# Patient Record
Sex: MALE | Race: OTHER | NOT HISPANIC OR LATINO
[De-identification: names, ages, dates, MRNs, and addresses within clinical notes are randomized per-mention and may not be internally consistent; named-entity substitution may affect disease eponyms.]

---

## 2023-09-01 ENCOUNTER — NON-APPOINTMENT (OUTPATIENT)
Age: 53
End: 2023-09-01

## 2023-09-15 ENCOUNTER — INPATIENT (INPATIENT)
Facility: HOSPITAL | Age: 53
LOS: 3 days | Discharge: HOME | DRG: 302 | End: 2023-09-19
Attending: THORACIC SURGERY (CARDIOTHORACIC VASCULAR SURGERY) | Admitting: THORACIC SURGERY (CARDIOTHORACIC VASCULAR SURGERY)
Payer: MEDICAID

## 2023-09-15 VITALS
DIASTOLIC BLOOD PRESSURE: 124 MMHG | HEIGHT: 70 IN | OXYGEN SATURATION: 95 % | TEMPERATURE: 99 F | WEIGHT: 168.65 LBS | RESPIRATION RATE: 19 BRPM | SYSTOLIC BLOOD PRESSURE: 176 MMHG | HEART RATE: 108 BPM

## 2023-09-15 LAB
A1C WITH ESTIMATED AVERAGE GLUCOSE RESULT: 8.4 % — HIGH (ref 4–5.6)
ALBUMIN SERPL ELPH-MCNC: 3.6 G/DL — SIGNIFICANT CHANGE UP (ref 3.3–5)
ALP SERPL-CCNC: 100 U/L — SIGNIFICANT CHANGE UP (ref 40–120)
ALT FLD-CCNC: 27 U/L — SIGNIFICANT CHANGE UP (ref 10–45)
ANION GAP SERPL CALC-SCNC: 10 MMOL/L — SIGNIFICANT CHANGE UP (ref 5–17)
APTT BLD: 34.2 SEC — SIGNIFICANT CHANGE UP (ref 24.5–35.6)
AST SERPL-CCNC: 41 U/L — HIGH (ref 10–40)
BASOPHILS # BLD AUTO: 0.09 K/UL — SIGNIFICANT CHANGE UP (ref 0–0.2)
BASOPHILS NFR BLD AUTO: 1 % — SIGNIFICANT CHANGE UP (ref 0–2)
BILIRUB SERPL-MCNC: 0.6 MG/DL — SIGNIFICANT CHANGE UP (ref 0.2–1.2)
BLD GP AB SCN SERPL QL: NEGATIVE — SIGNIFICANT CHANGE UP
BLD GP AB SCN SERPL QL: NEGATIVE — SIGNIFICANT CHANGE UP
BUN SERPL-MCNC: 28 MG/DL — HIGH (ref 7–23)
CALCIUM SERPL-MCNC: 9.3 MG/DL — SIGNIFICANT CHANGE UP (ref 8.4–10.5)
CHLORIDE SERPL-SCNC: 95 MMOL/L — LOW (ref 96–108)
CO2 SERPL-SCNC: 33 MMOL/L — HIGH (ref 22–31)
CREAT SERPL-MCNC: 0.97 MG/DL — SIGNIFICANT CHANGE UP (ref 0.5–1.3)
EGFR: 93 ML/MIN/1.73M2 — SIGNIFICANT CHANGE UP
EOSINOPHIL # BLD AUTO: 0.22 K/UL — SIGNIFICANT CHANGE UP (ref 0–0.5)
EOSINOPHIL NFR BLD AUTO: 2.5 % — SIGNIFICANT CHANGE UP (ref 0–6)
ESTIMATED AVERAGE GLUCOSE: 194 MG/DL — HIGH (ref 68–114)
GLUCOSE BLDC GLUCOMTR-MCNC: 211 MG/DL — HIGH (ref 70–99)
GLUCOSE SERPL-MCNC: 175 MG/DL — HIGH (ref 70–99)
HCT VFR BLD CALC: 39.5 % — SIGNIFICANT CHANGE UP (ref 39–50)
HGB BLD-MCNC: 13 G/DL — SIGNIFICANT CHANGE UP (ref 13–17)
IMM GRANULOCYTES NFR BLD AUTO: 0.2 % — SIGNIFICANT CHANGE UP (ref 0–0.9)
INR BLD: 1.24 — HIGH (ref 0.85–1.18)
LYMPHOCYTES # BLD AUTO: 1.72 K/UL — SIGNIFICANT CHANGE UP (ref 1–3.3)
LYMPHOCYTES # BLD AUTO: 19.2 % — SIGNIFICANT CHANGE UP (ref 13–44)
MCHC RBC-ENTMCNC: 30.1 PG — SIGNIFICANT CHANGE UP (ref 27–34)
MCHC RBC-ENTMCNC: 32.9 GM/DL — SIGNIFICANT CHANGE UP (ref 32–36)
MCV RBC AUTO: 91.4 FL — SIGNIFICANT CHANGE UP (ref 80–100)
MONOCYTES # BLD AUTO: 0.83 K/UL — SIGNIFICANT CHANGE UP (ref 0–0.9)
MONOCYTES NFR BLD AUTO: 9.3 % — SIGNIFICANT CHANGE UP (ref 2–14)
NEUTROPHILS # BLD AUTO: 6.09 K/UL — SIGNIFICANT CHANGE UP (ref 1.8–7.4)
NEUTROPHILS NFR BLD AUTO: 67.8 % — SIGNIFICANT CHANGE UP (ref 43–77)
NRBC # BLD: 0 /100 WBCS — SIGNIFICANT CHANGE UP (ref 0–0)
NT-PROBNP SERPL-SCNC: 1930 PG/ML — HIGH (ref 0–300)
PLATELET # BLD AUTO: 221 K/UL — SIGNIFICANT CHANGE UP (ref 150–400)
POTASSIUM SERPL-MCNC: 3.8 MMOL/L — SIGNIFICANT CHANGE UP (ref 3.5–5.3)
POTASSIUM SERPL-SCNC: 3.8 MMOL/L — SIGNIFICANT CHANGE UP (ref 3.5–5.3)
PROT SERPL-MCNC: 7 G/DL — SIGNIFICANT CHANGE UP (ref 6–8.3)
PROTHROM AB SERPL-ACNC: 14.1 SEC — HIGH (ref 9.5–13)
RBC # BLD: 4.32 M/UL — SIGNIFICANT CHANGE UP (ref 4.2–5.8)
RBC # FLD: 12.9 % — SIGNIFICANT CHANGE UP (ref 10.3–14.5)
RH IG SCN BLD-IMP: POSITIVE — SIGNIFICANT CHANGE UP
RH IG SCN BLD-IMP: POSITIVE — SIGNIFICANT CHANGE UP
SODIUM SERPL-SCNC: 138 MMOL/L — SIGNIFICANT CHANGE UP (ref 135–145)
TSH SERPL-MCNC: 0.72 UIU/ML — SIGNIFICANT CHANGE UP (ref 0.27–4.2)
WBC # BLD: 8.97 K/UL — SIGNIFICANT CHANGE UP (ref 3.8–10.5)
WBC # FLD AUTO: 8.97 K/UL — SIGNIFICANT CHANGE UP (ref 3.8–10.5)

## 2023-09-15 PROCEDURE — 71045 X-RAY EXAM CHEST 1 VIEW: CPT | Mod: 26

## 2023-09-15 RX ORDER — HEPARIN SODIUM 5000 [USP'U]/ML
5000 INJECTION INTRAVENOUS; SUBCUTANEOUS EVERY 8 HOURS
Refills: 0 | Status: DISCONTINUED | OUTPATIENT
Start: 2023-09-15 | End: 2023-09-19

## 2023-09-15 RX ORDER — DEXTROSE 50 % IN WATER 50 %
15 SYRINGE (ML) INTRAVENOUS ONCE
Refills: 0 | Status: DISCONTINUED | OUTPATIENT
Start: 2023-09-15 | End: 2023-09-19

## 2023-09-15 RX ORDER — DEXTROSE 50 % IN WATER 50 %
25 SYRINGE (ML) INTRAVENOUS ONCE
Refills: 0 | Status: DISCONTINUED | OUTPATIENT
Start: 2023-09-15 | End: 2023-09-19

## 2023-09-15 RX ORDER — SODIUM CHLORIDE 9 MG/ML
1000 INJECTION, SOLUTION INTRAVENOUS
Refills: 0 | Status: DISCONTINUED | OUTPATIENT
Start: 2023-09-15 | End: 2023-09-19

## 2023-09-15 RX ORDER — IPRATROPIUM/ALBUTEROL SULFATE 18-103MCG
3 AEROSOL WITH ADAPTER (GRAM) INHALATION EVERY 6 HOURS
Refills: 0 | Status: DISCONTINUED | OUTPATIENT
Start: 2023-09-15 | End: 2023-09-19

## 2023-09-15 RX ORDER — INSULIN LISPRO 100/ML
VIAL (ML) SUBCUTANEOUS
Refills: 0 | Status: DISCONTINUED | OUTPATIENT
Start: 2023-09-15 | End: 2023-09-16

## 2023-09-15 RX ORDER — ATORVASTATIN CALCIUM 80 MG/1
80 TABLET, FILM COATED ORAL AT BEDTIME
Refills: 0 | Status: DISCONTINUED | OUTPATIENT
Start: 2023-09-15 | End: 2023-09-19

## 2023-09-15 RX ORDER — ISOSORBIDE MONONITRATE 60 MG/1
30 TABLET, EXTENDED RELEASE ORAL DAILY
Refills: 0 | Status: DISCONTINUED | OUTPATIENT
Start: 2023-09-15 | End: 2023-09-19

## 2023-09-15 RX ORDER — METOPROLOL TARTRATE 50 MG
25 TABLET ORAL EVERY 12 HOURS
Refills: 0 | Status: DISCONTINUED | OUTPATIENT
Start: 2023-09-15 | End: 2023-09-16

## 2023-09-15 RX ORDER — ASPIRIN/CALCIUM CARB/MAGNESIUM 324 MG
81 TABLET ORAL DAILY
Refills: 0 | Status: DISCONTINUED | OUTPATIENT
Start: 2023-09-15 | End: 2023-09-19

## 2023-09-15 RX ORDER — ZOLPIDEM TARTRATE 10 MG/1
5 TABLET ORAL AT BEDTIME
Refills: 0 | Status: DISCONTINUED | OUTPATIENT
Start: 2023-09-15 | End: 2023-09-19

## 2023-09-15 RX ORDER — PANTOPRAZOLE SODIUM 20 MG/1
40 TABLET, DELAYED RELEASE ORAL
Refills: 0 | Status: DISCONTINUED | OUTPATIENT
Start: 2023-09-15 | End: 2023-09-19

## 2023-09-15 RX ORDER — SODIUM CHLORIDE 9 MG/ML
3 INJECTION INTRAMUSCULAR; INTRAVENOUS; SUBCUTANEOUS EVERY 8 HOURS
Refills: 0 | Status: DISCONTINUED | OUTPATIENT
Start: 2023-09-15 | End: 2023-09-19

## 2023-09-15 RX ORDER — GLUCAGON INJECTION, SOLUTION 0.5 MG/.1ML
1 INJECTION, SOLUTION SUBCUTANEOUS ONCE
Refills: 0 | Status: DISCONTINUED | OUTPATIENT
Start: 2023-09-15 | End: 2023-09-19

## 2023-09-15 RX ORDER — DEXTROSE 50 % IN WATER 50 %
12.5 SYRINGE (ML) INTRAVENOUS ONCE
Refills: 0 | Status: DISCONTINUED | OUTPATIENT
Start: 2023-09-15 | End: 2023-09-19

## 2023-09-15 RX ADMIN — ZOLPIDEM TARTRATE 5 MILLIGRAM(S): 10 TABLET ORAL at 23:16

## 2023-09-15 RX ADMIN — ATORVASTATIN CALCIUM 80 MILLIGRAM(S): 80 TABLET, FILM COATED ORAL at 21:08

## 2023-09-15 RX ADMIN — HEPARIN SODIUM 5000 UNIT(S): 5000 INJECTION INTRAVENOUS; SUBCUTANEOUS at 21:58

## 2023-09-15 RX ADMIN — SODIUM CHLORIDE 3 MILLILITER(S): 9 INJECTION INTRAMUSCULAR; INTRAVENOUS; SUBCUTANEOUS at 21:00

## 2023-09-15 RX ADMIN — Medication 25 MILLIGRAM(S): at 21:08

## 2023-09-15 RX ADMIN — Medication 4: at 21:58

## 2023-09-15 NOTE — H&P ADULT - HISTORY OF PRESENT ILLNESS
Patient is a 54 y/o male, smoker (5 cigarettes per day x 15 years), with no PMH or PSH who presented to [facility] for 3 weeks of progressive shortness of breath, a cough and bilateral LE edema. He never experienced chest pain.  At OSH, patient underwent a cardiac catheterization which revealed multi-vessel CAD for which he was referred to Dr. Naylor. He was transferred to [facility] on 9/15/23 for CABG evaluation.      Patient is a 52 y/o male, smoker (5 cigarettes per day x 15 years), with no reported PMH or PSH who presented to [facility] for 3 weeks of progressive shortness of breath, TREADWELL, a cough and bilateral LE edema. He never experienced chest pain.  At OSH, patient underwent a cardiac catheterization which revealed obstructive LAD disease for which he was referred to Dr. Naylor. He was transferred to [facility] on 9/15/23 for CABG evaluation.

## 2023-09-15 NOTE — H&P ADULT - NSHPPHYSICALEXAM_GEN_ALL_CORE
Appearance: No acute distress.  Neurologic: AAOx3, no AMS or focal deficits.  Responds appropriately to verbal and physical stimuli; exhibits purposeful movement in all extremities.  HEENT:   MMM, PERRLA, EOMI	b/l  Neck: Supple  Cardiovascular: RRR, S1 S2. No m/r/g.  Respiratory: No acute respiratory distress. CTA b/l, no w/r/r.   Gastrointestinal:  Soft, non-tender, non-distended, + BS.	  Skin: No rashes. No ecchymoses. No cyanosis.  Extremities: +1 bilateral LE edema. Exhibits normal range of motion, no clubbing, cyanosis  Vascular: Peripheral pulses palpable 2+ bilaterally.

## 2023-09-15 NOTE — PATIENT PROFILE ADULT - FALL HARM RISK - HARM RISK INTERVENTIONS

## 2023-09-15 NOTE — H&P ADULT - ASSESSMENT
Patient is a 54 y/o male, smoker (5 cigarettes per day x 15 years), with no PMH or PSH who presented to [facility] for 3 weeks of progressive shortness of breath, a cough and bilateral LE edema. He never experienced chest pain.  At OSH, patient underwent a cardiac catheterization which revealed multi-vessel CAD for which he was referred to Dr. Naylor. He was transferred to [facility] on 9/15/23 for CABG evaluation.       Plan:  Problem 1: Multi-vessel CAD  - Pre-surgical evaluation underway: pending TTE; CXR & carotid US   - Continue Aspirin, Lipitor, Metoprolol and Imdur   - Telemetry monitoring    Problem 2: HTN  - Metoprolol 25mg q12hr  - Imdur 30mg daily       Problem 3: Cough/Shortness of breath   - resolved as per patient  - PRN duonebs for SOB  - CXR pending       Problem 4: Bilateral LE pitting edema  - f/u findings of CXR & TTE        Patient is a 54 y/o male, smoker (5 cigarettes per day x 15 years), with no reported PMH or PSH who presented to [facility] for 3 weeks of progressive shortness of breath, TREADWELL, a cough and bilateral LE edema. He never experienced chest pain.  At OSH, patient underwent a cardiac catheterization which revealed obstructive LAD disease for which he was referred to Dr. Naylor. He was transferred to [facility] on 9/15/23 for CABG evaluation.       Plan:  Problem 1: CAD: Obstructive LAD disease  - TTE at OSH revealed EF 20-25%  - Pre-surgical evaluation underway: pending TTE; CXR & carotid US   - Continue Aspirin, Lipitor, Metoprolol and Imdur   - Telemetry monitoring    Problem 2: HTN  - Metoprolol 25mg q12hr  - Imdur 30mg daily       Problem 3: Cough/Shortness of breath   - resolved as per patient  - COVID/RVP negative at OSH  - PRN duonebs for SOB  - CXR pending       Problem 4: Bilateral LE pitting edema  - f/u findings of CXR & TTE     Problem 5: DM2  - ISS

## 2023-09-15 NOTE — H&P ADULT - NSHPREVIEWOFSYSTEMS_GEN_ALL_CORE
Review of Systems  CONSTITUTIONAL:  Denies Fevers / chills, sweats, fatigue, weight loss, weight gain                                      NEURO:  Denies parathesias, seizures, syncope, confusion                                                                                EYES:  Denies Blurry vision, discharge, pain, loss of vision                                                                                    ENMT:  Denies Difficulty hearing, vertigo, dysphagia, epistaxis, recent dental work                                       CV:  Denies Chest pain, palpitations, TREADWELL, orthopnea                                                                                          RESPIRATORY:  Denies Wheezing, SOB, cough / sputum, hemoptysis                                                                GI:  Denies Nausea, vomiting, diarrhea, constipation, melena, difficulty swallowing                                               : Denies Hematuria, dysuria, urgency, incontinence                                                                                         MUSCULOSKELETAL:  Denies arthritis, joint swelling, muscle weakness                                                             SKIN/BREAST:  Denies rash, itching, hair loss, masses                                                                                            PSYCH:  Denies depression, anxiety, suicidal ideation                                                                                               HEME/LYMPH:  Denies bruises easily, enlarged lymph nodes, tender lymph nodes                                        ENDOCRINE:  Denies cold intolerance, heat intolerance, polydipsia

## 2023-09-16 LAB
A1C WITH ESTIMATED AVERAGE GLUCOSE RESULT: 8.8 % — HIGH (ref 4–5.6)
ALBUMIN SERPL ELPH-MCNC: 3.3 G/DL — SIGNIFICANT CHANGE UP (ref 3.3–5)
ALP SERPL-CCNC: 100 U/L — SIGNIFICANT CHANGE UP (ref 40–120)
ALT FLD-CCNC: 24 U/L — SIGNIFICANT CHANGE UP (ref 10–45)
ANION GAP SERPL CALC-SCNC: 10 MMOL/L — SIGNIFICANT CHANGE UP (ref 5–17)
AST SERPL-CCNC: 29 U/L — SIGNIFICANT CHANGE UP (ref 10–40)
BASOPHILS # BLD AUTO: 0.08 K/UL — SIGNIFICANT CHANGE UP (ref 0–0.2)
BASOPHILS NFR BLD AUTO: 1.1 % — SIGNIFICANT CHANGE UP (ref 0–2)
BILIRUB SERPL-MCNC: 0.8 MG/DL — SIGNIFICANT CHANGE UP (ref 0.2–1.2)
BUN SERPL-MCNC: 22 MG/DL — SIGNIFICANT CHANGE UP (ref 7–23)
CALCIUM SERPL-MCNC: 9.1 MG/DL — SIGNIFICANT CHANGE UP (ref 8.4–10.5)
CHLORIDE SERPL-SCNC: 98 MMOL/L — SIGNIFICANT CHANGE UP (ref 96–108)
CHOLEST SERPL-MCNC: 92 MG/DL — SIGNIFICANT CHANGE UP
CO2 SERPL-SCNC: 30 MMOL/L — SIGNIFICANT CHANGE UP (ref 22–31)
CREAT SERPL-MCNC: 0.79 MG/DL — SIGNIFICANT CHANGE UP (ref 0.5–1.3)
EGFR: 106 ML/MIN/1.73M2 — SIGNIFICANT CHANGE UP
EOSINOPHIL # BLD AUTO: 0.29 K/UL — SIGNIFICANT CHANGE UP (ref 0–0.5)
EOSINOPHIL NFR BLD AUTO: 3.9 % — SIGNIFICANT CHANGE UP (ref 0–6)
ESTIMATED AVERAGE GLUCOSE: 206 MG/DL — HIGH (ref 68–114)
GLUCOSE BLDC GLUCOMTR-MCNC: 135 MG/DL — HIGH (ref 70–99)
GLUCOSE BLDC GLUCOMTR-MCNC: 162 MG/DL — HIGH (ref 70–99)
GLUCOSE BLDC GLUCOMTR-MCNC: 234 MG/DL — HIGH (ref 70–99)
GLUCOSE BLDC GLUCOMTR-MCNC: 265 MG/DL — HIGH (ref 70–99)
GLUCOSE SERPL-MCNC: 268 MG/DL — HIGH (ref 70–99)
HCT VFR BLD CALC: 39.4 % — SIGNIFICANT CHANGE UP (ref 39–50)
HDLC SERPL-MCNC: 28 MG/DL — LOW
HGB BLD-MCNC: 12.9 G/DL — LOW (ref 13–17)
IMM GRANULOCYTES NFR BLD AUTO: 0.3 % — SIGNIFICANT CHANGE UP (ref 0–0.9)
LIPID PNL WITH DIRECT LDL SERPL: 38 MG/DL — SIGNIFICANT CHANGE UP
LYMPHOCYTES # BLD AUTO: 1.77 K/UL — SIGNIFICANT CHANGE UP (ref 1–3.3)
LYMPHOCYTES # BLD AUTO: 23.5 % — SIGNIFICANT CHANGE UP (ref 13–44)
MAGNESIUM SERPL-MCNC: 2.1 MG/DL — SIGNIFICANT CHANGE UP (ref 1.6–2.6)
MCHC RBC-ENTMCNC: 30.1 PG — SIGNIFICANT CHANGE UP (ref 27–34)
MCHC RBC-ENTMCNC: 32.7 GM/DL — SIGNIFICANT CHANGE UP (ref 32–36)
MCV RBC AUTO: 91.8 FL — SIGNIFICANT CHANGE UP (ref 80–100)
MONOCYTES # BLD AUTO: 0.64 K/UL — SIGNIFICANT CHANGE UP (ref 0–0.9)
MONOCYTES NFR BLD AUTO: 8.5 % — SIGNIFICANT CHANGE UP (ref 2–14)
NEUTROPHILS # BLD AUTO: 4.72 K/UL — SIGNIFICANT CHANGE UP (ref 1.8–7.4)
NEUTROPHILS NFR BLD AUTO: 62.7 % — SIGNIFICANT CHANGE UP (ref 43–77)
NON HDL CHOLESTEROL: 64 MG/DL — SIGNIFICANT CHANGE UP
NRBC # BLD: 0 /100 WBCS — SIGNIFICANT CHANGE UP (ref 0–0)
PLATELET # BLD AUTO: 217 K/UL — SIGNIFICANT CHANGE UP (ref 150–400)
POTASSIUM SERPL-MCNC: 3.4 MMOL/L — LOW (ref 3.5–5.3)
POTASSIUM SERPL-SCNC: 3.4 MMOL/L — LOW (ref 3.5–5.3)
PROT SERPL-MCNC: 6.7 G/DL — SIGNIFICANT CHANGE UP (ref 6–8.3)
RBC # BLD: 4.29 M/UL — SIGNIFICANT CHANGE UP (ref 4.2–5.8)
RBC # FLD: 13 % — SIGNIFICANT CHANGE UP (ref 10.3–14.5)
SODIUM SERPL-SCNC: 138 MMOL/L — SIGNIFICANT CHANGE UP (ref 135–145)
TRIGL SERPL-MCNC: 131 MG/DL — SIGNIFICANT CHANGE UP
WBC # BLD: 7.52 K/UL — SIGNIFICANT CHANGE UP (ref 3.8–10.5)
WBC # FLD AUTO: 7.52 K/UL — SIGNIFICANT CHANGE UP (ref 3.8–10.5)

## 2023-09-16 PROCEDURE — 93880 EXTRACRANIAL BILAT STUDY: CPT | Mod: 26

## 2023-09-16 PROCEDURE — 93306 TTE W/DOPPLER COMPLETE: CPT | Mod: 26

## 2023-09-16 RX ORDER — HYDRALAZINE HCL 50 MG
10 TABLET ORAL ONCE
Refills: 0 | Status: COMPLETED | OUTPATIENT
Start: 2023-09-16 | End: 2023-09-16

## 2023-09-16 RX ORDER — INFLUENZA VIRUS VACCINE 15; 15; 15; 15 UG/.5ML; UG/.5ML; UG/.5ML; UG/.5ML
0.5 SUSPENSION INTRAMUSCULAR ONCE
Refills: 0 | Status: DISCONTINUED | OUTPATIENT
Start: 2023-09-16 | End: 2023-09-19

## 2023-09-16 RX ORDER — FUROSEMIDE 40 MG
20 TABLET ORAL ONCE
Refills: 0 | Status: DISCONTINUED | OUTPATIENT
Start: 2023-09-16 | End: 2023-09-16

## 2023-09-16 RX ORDER — INSULIN LISPRO 100/ML
VIAL (ML) SUBCUTANEOUS
Refills: 0 | Status: DISCONTINUED | OUTPATIENT
Start: 2023-09-16 | End: 2023-09-19

## 2023-09-16 RX ORDER — FUROSEMIDE 40 MG
20 TABLET ORAL ONCE
Refills: 0 | Status: COMPLETED | OUTPATIENT
Start: 2023-09-16 | End: 2023-09-16

## 2023-09-16 RX ORDER — HYDRALAZINE HCL 50 MG
10 TABLET ORAL EVERY 8 HOURS
Refills: 0 | Status: DISCONTINUED | OUTPATIENT
Start: 2023-09-16 | End: 2023-09-16

## 2023-09-16 RX ORDER — INSULIN LISPRO 100/ML
5 VIAL (ML) SUBCUTANEOUS
Refills: 0 | Status: DISCONTINUED | OUTPATIENT
Start: 2023-09-16 | End: 2023-09-18

## 2023-09-16 RX ORDER — METOPROLOL TARTRATE 50 MG
25 TABLET ORAL EVERY 8 HOURS
Refills: 0 | Status: DISCONTINUED | OUTPATIENT
Start: 2023-09-16 | End: 2023-09-16

## 2023-09-16 RX ORDER — HYDRALAZINE HCL 50 MG
10 TABLET ORAL EVERY 6 HOURS
Refills: 0 | Status: DISCONTINUED | OUTPATIENT
Start: 2023-09-16 | End: 2023-09-16

## 2023-09-16 RX ORDER — METOPROLOL TARTRATE 50 MG
25 TABLET ORAL EVERY 8 HOURS
Refills: 0 | Status: DISCONTINUED | OUTPATIENT
Start: 2023-09-16 | End: 2023-09-17

## 2023-09-16 RX ORDER — INSULIN GLARGINE 100 [IU]/ML
15 INJECTION, SOLUTION SUBCUTANEOUS AT BEDTIME
Refills: 0 | Status: DISCONTINUED | OUTPATIENT
Start: 2023-09-16 | End: 2023-09-19

## 2023-09-16 RX ORDER — HYDRALAZINE HCL 50 MG
25 TABLET ORAL EVERY 8 HOURS
Refills: 0 | Status: DISCONTINUED | OUTPATIENT
Start: 2023-09-16 | End: 2023-09-17

## 2023-09-16 RX ORDER — POTASSIUM CHLORIDE 20 MEQ
40 PACKET (EA) ORAL ONCE
Refills: 0 | Status: COMPLETED | OUTPATIENT
Start: 2023-09-16 | End: 2023-09-16

## 2023-09-16 RX ORDER — GLUCAGON INJECTION, SOLUTION 0.5 MG/.1ML
1 INJECTION, SOLUTION SUBCUTANEOUS ONCE
Refills: 0 | Status: DISCONTINUED | OUTPATIENT
Start: 2023-09-16 | End: 2023-09-19

## 2023-09-16 RX ORDER — ACETAMINOPHEN 500 MG
1000 TABLET ORAL EVERY 6 HOURS
Refills: 0 | Status: DISCONTINUED | OUTPATIENT
Start: 2023-09-16 | End: 2023-09-19

## 2023-09-16 RX ADMIN — PANTOPRAZOLE SODIUM 40 MILLIGRAM(S): 20 TABLET, DELAYED RELEASE ORAL at 05:33

## 2023-09-16 RX ADMIN — ISOSORBIDE MONONITRATE 30 MILLIGRAM(S): 60 TABLET, EXTENDED RELEASE ORAL at 11:05

## 2023-09-16 RX ADMIN — SODIUM CHLORIDE 3 MILLILITER(S): 9 INJECTION INTRAMUSCULAR; INTRAVENOUS; SUBCUTANEOUS at 07:35

## 2023-09-16 RX ADMIN — INSULIN GLARGINE 15 UNIT(S): 100 INJECTION, SOLUTION SUBCUTANEOUS at 21:51

## 2023-09-16 RX ADMIN — Medication 81 MILLIGRAM(S): at 11:04

## 2023-09-16 RX ADMIN — HEPARIN SODIUM 5000 UNIT(S): 5000 INJECTION INTRAVENOUS; SUBCUTANEOUS at 14:29

## 2023-09-16 RX ADMIN — Medication 20 MILLIGRAM(S): at 11:20

## 2023-09-16 RX ADMIN — Medication 3 MILLILITER(S): at 11:15

## 2023-09-16 RX ADMIN — Medication 10 MILLIGRAM(S): at 17:05

## 2023-09-16 RX ADMIN — Medication 25 MILLIGRAM(S): at 21:37

## 2023-09-16 RX ADMIN — Medication 25 MILLIGRAM(S): at 05:33

## 2023-09-16 RX ADMIN — HEPARIN SODIUM 5000 UNIT(S): 5000 INJECTION INTRAVENOUS; SUBCUTANEOUS at 05:33

## 2023-09-16 RX ADMIN — SODIUM CHLORIDE 3 MILLILITER(S): 9 INJECTION INTRAMUSCULAR; INTRAVENOUS; SUBCUTANEOUS at 13:51

## 2023-09-16 RX ADMIN — ZOLPIDEM TARTRATE 5 MILLIGRAM(S): 10 TABLET ORAL at 21:38

## 2023-09-16 RX ADMIN — Medication 6: at 17:05

## 2023-09-16 RX ADMIN — ATORVASTATIN CALCIUM 80 MILLIGRAM(S): 80 TABLET, FILM COATED ORAL at 21:38

## 2023-09-16 RX ADMIN — Medication 40 MILLIEQUIVALENT(S): at 10:00

## 2023-09-16 RX ADMIN — Medication 10 MILLIGRAM(S): at 10:00

## 2023-09-16 RX ADMIN — Medication 4: at 07:31

## 2023-09-16 RX ADMIN — Medication 2: at 12:08

## 2023-09-16 RX ADMIN — Medication 25 MILLIGRAM(S): at 14:27

## 2023-09-16 RX ADMIN — Medication 1000 MILLIGRAM(S): at 22:34

## 2023-09-16 RX ADMIN — HEPARIN SODIUM 5000 UNIT(S): 5000 INJECTION INTRAVENOUS; SUBCUTANEOUS at 21:38

## 2023-09-16 RX ADMIN — SODIUM CHLORIDE 3 MILLILITER(S): 9 INJECTION INTRAMUSCULAR; INTRAVENOUS; SUBCUTANEOUS at 22:33

## 2023-09-16 RX ADMIN — Medication 10 MILLIGRAM(S): at 14:27

## 2023-09-16 RX ADMIN — Medication 5 UNIT(S): at 17:05

## 2023-09-16 RX ADMIN — Medication 1000 MILLIGRAM(S): at 22:04

## 2023-09-16 NOTE — PROGRESS NOTE ADULT - SUBJECTIVE AND OBJECTIVE BOX
Patient discussed on morning rounds with Dr. Lee     OPERATION & DATE: Preoperative w/up for MIDCAB    SUBJECTIVE ASSESSMENT: Seen resting in bed. Appears anxious. Denies current chest pain. Admits to TREADWELL.     VITAL SIGNS:  Vital Signs Last 24 Hrs  T(C): 36.2 (16 Sep 2023 09:58), Max: 37 (15 Sep 2023 20:28)  T(F): 97.1 (16 Sep 2023 09:58), Max: 98.6 (15 Sep 2023 20:28)  HR: 96 (16 Sep 2023 12:21) (90 - 108)  BP: 143/88 (16 Sep 2023 12:21) (143/88 - 176/124)  BP(mean): 101 (16 Sep 2023 12:21) (101 - 146)  RR: 16 (16 Sep 2023 12:21) (16 - 20)  SpO2: 93% (16 Sep 2023 12:21) (93% - 98%)    Parameters below as of 16 Sep 2023 12:21  Patient On (Oxygen Delivery Method): room air      I&O's Detail    15 Sep 2023 07:01  -  16 Sep 2023 07:00  --------------------------------------------------------  IN:  Total IN: 0 mL    OUT:    Voided (mL): 0 mL  Total OUT: 0 mL    Total NET: 0 mL    16 Sep 2023 07:01  -  16 Sep 2023 15:01  --------------------------------------------------------  IN:    Oral Fluid: 180 mL  Total IN: 180 mL    OUT:  Total OUT: 0 mL    Total NET: 180 mL    CHEST TUBE:    PACO DRAIN:    EPICARDIAL WIRES:   STITCHES:  STAPLES:  RICH:   CENTRAL LINE:  MIDLINE/PICC:  WOUND VAC:    PHYSICAL EXAM:  General:  HEENT:  Cardio:  Pulm:  GI:  Extremities:  Vascular:  Incisions:    LABS:                        12.9   7.52  )-----------( 217      ( 16 Sep 2023 06:57 )             39.4     PT/INR - ( 15 Sep 2023 21:26 )   PT: 14.1 sec;   INR: 1.24          PTT - ( 15 Sep 2023 21:26 )  PTT:34.2 sec  09-16    138  |  98  |  22  ----------------------------<  268<H>  3.4<L>   |  30  |  0.79    Ca    9.1      16 Sep 2023 06:57    TPro  6.7  /  Alb  3.3  /  TBili  0.8  /  DBili  x   /  AST  29  /  ALT  24  /  AlkPhos  100  09-16    Urinalysis Basic - ( 16 Sep 2023 06:57 )    Color: x / Appearance: x / SG: x / pH: x  Gluc: 268 mg/dL / Ketone: x  / Bili: x / Urobili: x   Blood: x / Protein: x / Nitrite: x   Leuk Esterase: x / RBC: x / WBC x   Sq Epi: x / Non Sq Epi: x / Bacteria: x      MEDICATIONS  (STANDING):  aspirin enteric coated 81 milliGRAM(s) Oral daily  atorvastatin 80 milliGRAM(s) Oral at bedtime  dextrose 5%. 1000 milliLiter(s) (50 mL/Hr) IV Continuous <Continuous>  dextrose 5%. 1000 milliLiter(s) (100 mL/Hr) IV Continuous <Continuous>  dextrose 50% Injectable 25 Gram(s) IV Push once  dextrose 50% Injectable 25 Gram(s) IV Push once  dextrose 50% Injectable 12.5 Gram(s) IV Push once  glucagon  Injectable 1 milliGRAM(s) IntraMuscular once  heparin   Injectable 5000 Unit(s) SubCutaneous every 8 hours  hydrALAZINE 10 milliGRAM(s) Oral every 8 hours  influenza   Vaccine 0.5 milliLiter(s) IntraMuscular once  insulin lispro (ADMELOG) corrective regimen sliding scale   SubCutaneous three times a day before meals  isosorbide   mononitrate ER Tablet (IMDUR) 30 milliGRAM(s) Oral daily  metoprolol tartrate 25 milliGRAM(s) Oral every 8 hours  pantoprazole    Tablet 40 milliGRAM(s) Oral before breakfast  sodium chloride 0.9% lock flush 3 milliLiter(s) IV Push every 8 hours    MEDICATIONS  (PRN):  albuterol/ipratropium for Nebulization 3 milliLiter(s) Nebulizer every 6 hours PRN Shortness of Breath and/or Wheezing  dextrose Oral Gel 15 Gram(s) Oral once PRN Blood Glucose LESS THAN 70 milliGRAM(s)/deciliter  zolpidem 5 milliGRAM(s) Oral at bedtime PRN Insomnia    RADIOLOGY & ADDITIONAL TESTS:     Patient discussed on morning rounds with Dr. Lee     OPERATION & DATE: Preoperative w/up for MIDCAB    SUBJECTIVE ASSESSMENT: Seen resting in bed. Appears anxious. Denies current chest pain. Admits to TREADWELL.     VITAL SIGNS:  Vital Signs Last 24 Hrs  T(C): 36.2 (16 Sep 2023 09:58), Max: 37 (15 Sep 2023 20:28)  T(F): 97.1 (16 Sep 2023 09:58), Max: 98.6 (15 Sep 2023 20:28)  HR: 96 (16 Sep 2023 12:21) (90 - 108)  BP: 143/88 (16 Sep 2023 12:21) (143/88 - 176/124)  BP(mean): 101 (16 Sep 2023 12:21) (101 - 146)  RR: 16 (16 Sep 2023 12:21) (16 - 20)  SpO2: 93% (16 Sep 2023 12:21) (93% - 98%)    Parameters below as of 16 Sep 2023 12:21  Patient On (Oxygen Delivery Method): room air      I&O's Detail    15 Sep 2023 07:01  -  16 Sep 2023 07:00  --------------------------------------------------------  IN:  Total IN: 0 mL    OUT:    Voided (mL): 0 mL  Total OUT: 0 mL    Total NET: 0 mL    16 Sep 2023 07:01  -  16 Sep 2023 15:01  --------------------------------------------------------  IN:    Oral Fluid: 180 mL  Total IN: 180 mL    OUT:  Total OUT: 0 mL    Total NET: 180 mL    CHEST TUBE: n   PACO DRAIN: n   EPICARDIAL WIRES: n  STITCHES:n  STAPLES:n  RICH: n  CENTRAL LINE:n  MIDLINE/PICC:n  WOUND VAC:n    PHYSICAL EXAM:  General: NAD, sitting comfortably in chair, conversing appropriately  Neurological: alert and oriented, UE and LE strength equal b/l, facial symmetry present  Cardiovascular: RRR, Clear S1 and S2, no murmurs appreciated  Respiratory: chest expansion symmetrical, CTA b/l, no wheezing noted  Gastrointestinal: +BS, soft, NT, ND  Extremities: moving spontaneously, no calf tenderness or edema.  Vascular: warm, well perfused. DP/PT pulses palpable b/l.  Incisions: none       LABS:                        12.9   7.52  )-----------( 217      ( 16 Sep 2023 06:57 )             39.4     PT/INR - ( 15 Sep 2023 21:26 )   PT: 14.1 sec;   INR: 1.24          PTT - ( 15 Sep 2023 21:26 )  PTT:34.2 sec  09-16    138  |  98  |  22  ----------------------------<  268<H>  3.4<L>   |  30  |  0.79    Ca    9.1      16 Sep 2023 06:57    TPro  6.7  /  Alb  3.3  /  TBili  0.8  /  DBili  x   /  AST  29  /  ALT  24  /  AlkPhos  100  09-16    Urinalysis Basic - ( 16 Sep 2023 06:57 )    Color: x / Appearance: x / SG: x / pH: x  Gluc: 268 mg/dL / Ketone: x  / Bili: x / Urobili: x   Blood: x / Protein: x / Nitrite: x   Leuk Esterase: x / RBC: x / WBC x   Sq Epi: x / Non Sq Epi: x / Bacteria: x      MEDICATIONS  (STANDING):  aspirin enteric coated 81 milliGRAM(s) Oral daily  atorvastatin 80 milliGRAM(s) Oral at bedtime  dextrose 5%. 1000 milliLiter(s) (50 mL/Hr) IV Continuous <Continuous>  dextrose 5%. 1000 milliLiter(s) (100 mL/Hr) IV Continuous <Continuous>  dextrose 50% Injectable 25 Gram(s) IV Push once  dextrose 50% Injectable 25 Gram(s) IV Push once  dextrose 50% Injectable 12.5 Gram(s) IV Push once  glucagon  Injectable 1 milliGRAM(s) IntraMuscular once  heparin   Injectable 5000 Unit(s) SubCutaneous every 8 hours  hydrALAZINE 10 milliGRAM(s) Oral every 8 hours  influenza   Vaccine 0.5 milliLiter(s) IntraMuscular once  insulin lispro (ADMELOG) corrective regimen sliding scale   SubCutaneous three times a day before meals  isosorbide   mononitrate ER Tablet (IMDUR) 30 milliGRAM(s) Oral daily  metoprolol tartrate 25 milliGRAM(s) Oral every 8 hours  pantoprazole    Tablet 40 milliGRAM(s) Oral before breakfast  sodium chloride 0.9% lock flush 3 milliLiter(s) IV Push every 8 hours    MEDICATIONS  (PRN):  albuterol/ipratropium for Nebulization 3 milliLiter(s) Nebulizer every 6 hours PRN Shortness of Breath and/or Wheezing  dextrose Oral Gel 15 Gram(s) Oral once PRN Blood Glucose LESS THAN 70 milliGRAM(s)/deciliter  zolpidem 5 milliGRAM(s) Oral at bedtime PRN Insomnia    RADIOLOGY & ADDITIONAL TESTS:     Patient discussed on morning rounds with Dr. Waggoner     OPERATION & DATE: Preoperative w/up for MIDCAB    SUBJECTIVE ASSESSMENT: Seen resting in bed. Appears anxious. Denies current chest pain. Admits to TREADWELL.     VITAL SIGNS:  Vital Signs Last 24 Hrs  T(C): 36.2 (16 Sep 2023 09:58), Max: 37 (15 Sep 2023 20:28)  T(F): 97.1 (16 Sep 2023 09:58), Max: 98.6 (15 Sep 2023 20:28)  HR: 96 (16 Sep 2023 12:21) (90 - 108)  BP: 143/88 (16 Sep 2023 12:21) (143/88 - 176/124)  BP(mean): 101 (16 Sep 2023 12:21) (101 - 146)  RR: 16 (16 Sep 2023 12:21) (16 - 20)  SpO2: 93% (16 Sep 2023 12:21) (93% - 98%)    Parameters below as of 16 Sep 2023 12:21  Patient On (Oxygen Delivery Method): room air    I&O's Detail    15 Sep 2023 07:01  -  16 Sep 2023 07:00  --------------------------------------------------------  IN:  Total IN: 0 mL    OUT:    Voided (mL): 0 mL  Total OUT: 0 mL    Total NET: 0 mL    16 Sep 2023 07:01  -  16 Sep 2023 15:01  --------------------------------------------------------  IN:    Oral Fluid: 180 mL  Total IN: 180 mL    OUT:  Total OUT: 0 mL    Total NET: 180 mL    CHEST TUBE: n   PACO DRAIN: n   EPICARDIAL WIRES: n  STITCHES:n  STAPLES:n  RICH: n  CENTRAL LINE:n  MIDLINE/PICC:n  WOUND VAC:n    PHYSICAL EXAM:  General: NAD, sitting comfortably in chair, conversing appropriately  Neurological: alert and oriented, UE and LE strength equal b/l, facial symmetry present  Cardiovascular: RRR, Clear S1 and S2, no murmurs appreciated  Respiratory: chest expansion symmetrical, CTA b/l, no wheezing noted  Gastrointestinal: +BS, soft, NT, ND  Extremities: moving spontaneously, no calf tenderness or edema.  Vascular: warm, well perfused. DP/PT pulses palpable b/l.  Incisions: none     LABS:                        12.9   7.52  )-----------( 217      ( 16 Sep 2023 06:57 )             39.4     PT/INR - ( 15 Sep 2023 21:26 )   PT: 14.1 sec;   INR: 1.24          PTT - ( 15 Sep 2023 21:26 )  PTT:34.2 sec  09-16    138  |  98  |  22  ----------------------------<  268<H>  3.4<L>   |  30  |  0.79    Ca    9.1      16 Sep 2023 06:57    TPro  6.7  /  Alb  3.3  /  TBili  0.8  /  DBili  x   /  AST  29  /  ALT  24  /  AlkPhos  100  09-16    Urinalysis Basic - ( 16 Sep 2023 06:57 )    Color: x / Appearance: x / SG: x / pH: x  Gluc: 268 mg/dL / Ketone: x  / Bili: x / Urobili: x   Blood: x / Protein: x / Nitrite: x   Leuk Esterase: x / RBC: x / WBC x   Sq Epi: x / Non Sq Epi: x / Bacteria: x    MEDICATIONS  (STANDING):  aspirin enteric coated 81 milliGRAM(s) Oral daily  atorvastatin 80 milliGRAM(s) Oral at bedtime  dextrose 5%. 1000 milliLiter(s) (50 mL/Hr) IV Continuous <Continuous>  dextrose 5%. 1000 milliLiter(s) (100 mL/Hr) IV Continuous <Continuous>  dextrose 50% Injectable 25 Gram(s) IV Push once  dextrose 50% Injectable 25 Gram(s) IV Push once  dextrose 50% Injectable 12.5 Gram(s) IV Push once  glucagon  Injectable 1 milliGRAM(s) IntraMuscular once  heparin   Injectable 5000 Unit(s) SubCutaneous every 8 hours  hydrALAZINE 10 milliGRAM(s) Oral every 8 hours  influenza   Vaccine 0.5 milliLiter(s) IntraMuscular once  insulin lispro (ADMELOG) corrective regimen sliding scale   SubCutaneous three times a day before meals  isosorbide   mononitrate ER Tablet (IMDUR) 30 milliGRAM(s) Oral daily  metoprolol tartrate 25 milliGRAM(s) Oral every 8 hours  pantoprazole    Tablet 40 milliGRAM(s) Oral before breakfast  sodium chloride 0.9% lock flush 3 milliLiter(s) IV Push every 8 hours    MEDICATIONS  (PRN):  albuterol/ipratropium for Nebulization 3 milliLiter(s) Nebulizer every 6 hours PRN Shortness of Breath and/or Wheezing  dextrose Oral Gel 15 Gram(s) Oral once PRN Blood Glucose LESS THAN 70 milliGRAM(s)/deciliter  zolpidem 5 milliGRAM(s) Oral at bedtime PRN Insomnia    RADIOLOGY & ADDITIONAL TESTS:    TTE (9/16/2023)   1. Moderately dilated left ventricular size.   2. Severely reduced left ventricular systolic function, LVEF 10-15%.   3. Grade III left ventricular diastolic dysfunction.   4. Mildly dilated right ventricular size.   5. Moderately reduced right ventricular systolic function.   6. Biatrial enlargement.   7. Mild-to-moderate mitral regurgitation.   8. Pulmonary hypertension present, pulmonary artery systolic pressure is 60 mmHg.   9. Trivial pericardial effusion.  10. No prior echo is available for comparison.    Carotid US 9/16/2023  IMPRESSION: No hemodynamically significant stenosis of either carotid artery.

## 2023-09-16 NOTE — PROGRESS NOTE ADULT - ASSESSMENT
54 yo M smoker (5 cigarettes per day x 15 years), with no reported PMH or PSH who presented to [facility] for 3 weeks of progressive shortness of breath, TREADWELL, a cough and bilateral LE edema. At OSH, patient underwent a cardiac catheterization which revealed obstructive LAD disease for which he was referred to Dr. Naylor. He was transferred to [facility] on 9/15/23 for CABG evaluation.     9/16 BP elevated, uptitrate BB and start hydralazine.     Plan:  Problem 1: CAD: Obstructive LAD disease  - TTE at OSH revealed EF 20-25%  - Pre-surgical evaluation underway: pending TTE; CXR & carotid US   - Continue Aspirin, Lipitor, Metoprolol and Imdur   - Telemetry monitoring    Problem 2: HTN  - Metoprolol 25mg q12hr  - Imdur 30mg daily       Problem 3: Cough/Shortness of breath   - resolved as per patient  - COVID/RVP negative at OSH  - PRN duonebs for SOB  - CXR pending       Problem 4: Bilateral LE pitting edema  - f/u findings of CXR & TTE     Problem 5:   DM II   - A1c 8.8; no home meds  - will consult endocrine in AM      54 yo M smoker (5 cigarettes per day x 15 years), with no reported PMH or PSH who presented to [facility] for 3 weeks of progressive shortness of breath, TREADWELL, a cough and bilateral LE edema. At OSH, patient underwent a cardiac catheterization which revealed obstructive LAD disease for which he was referred to Dr. Naylor. He was transferred to [facility] on 9/15/23 for CABG evaluation.     9/16 BP elevated, up-titrate BB. Echo w/ LVEF 10-15%.     Plan:  Problem 1: CAD: Obstructive LAD disease  - TTE at OSH revealed EF 20-25%  - Pre-surgical evaluation underway: pending TTE; CXR & carotid US   - Continue Aspirin, Lipitor, Metoprolol and Imdur   - Telemetry monitoring    Problem 2: HTN  - Metoprolol 25mg q12hr  - Imdur 30mg daily       Problem 3: Cough/Shortness of breath   - resolved as per patient  - COVID/RVP negative at OSH  - PRN duonebs for SOB  - CXR pending       Problem 4: Bilateral LE pitting edema  - f/u findings of CXR & TTE     Problem 5:   DM II   - A1c 8.8; no home meds  - will consult endocrine in AM      54 yo M smoker (5 cigarettes per day x 15 years), with no reported PMH or PSH who presented to [facility] for 3 weeks of progressive shortness of breath, TREADWELL, a cough and bilateral LE edema. At OSH, patient underwent a cardiac catheterization which revealed obstructive LAD disease for which he was referred to Dr. Naylor and transferred to [facility] on 9/15/23 for CABG evaluation. 9/16 BP elevated, up-titrate BB and start hydralazine. Echo w/ LVEF 10-15%. A1c 8.8. Endocrine consulted. Will consult HF in AM. Tx to 9LA for further management.     Plan:    Neurovascular:   -Pain well controlled with current regimen.   -PRN's: tylenol     Cardiovascular:   CAD  - obstructive LAD disease per cath from OSH; undergoing w/up for midCAB  - on ASA, statin, BB, imdur    Acute on chronic systolic HF // NICM   - LVEF 10-15% per echo today   - mildly volume up on exam, IV lasix 20mg x 1 and monitor output, consult HF in AM for optimization of GDMT   Mild to moderate MR   - per echo today   HTN, uncontrolled   - uptitrate BB, IV hydralazine 10mg TID   - Telemetry monitoring    Respiratory:   Cough, resolved   - COVID/RVP negative at OSH  - PRN duonebs for SOB  -Oxygenating well on room air  -Encourage continued use of IS 10x/hr and frequent ambulation  -CXR: Right effusion, diuresis     GI:  -GI PPX: protonix   -PO Diet  -Bowel Regimen: senna/miralax     Renal / :  Hypokalemia   - K+ 3.4, replete per protocol   -Continue to monitor renal function: BUN/Cr: 22/0.79  -Monitor I/O's daily     Endocrine:    DM II (not on home meds)   -A1c: 8.8%; started Lantus 15, Lispro 5, SSI, CC diet   No hx of thyroid disease   -TSH: 0.723    Hematologic:  -no overt s/s of active bleeding   -CBC: H/H- 12.9/39.4  -Coagulation Panel.    ID:  -Temperature: afebrile   -CBC: WBC-7.52  -Continue to observe for SIRS/Sepsis Syndrome.    Prophylaxis:  -DVT prophylaxis with 5000 SubQ Heparin q8h.  -Continue with SCD's b/l while patient is at rest     Disposition:  Preop w/up for MIDCAB in the setting of obstructive LAD disease   TTE today w/ LVEF 10-15%, consult HF in AM   Endocrine consulted, started on Lantus/Lispro   Uncontrolled HTN, increase BB to 25mg q8, start hydralazine and uptitrate as tolerated

## 2023-09-17 DIAGNOSIS — E11.9 TYPE 2 DIABETES MELLITUS WITHOUT COMPLICATIONS: ICD-10-CM

## 2023-09-17 DIAGNOSIS — E78.5 HYPERLIPIDEMIA, UNSPECIFIED: ICD-10-CM

## 2023-09-17 DIAGNOSIS — I10 ESSENTIAL (PRIMARY) HYPERTENSION: ICD-10-CM

## 2023-09-17 LAB
ANION GAP SERPL CALC-SCNC: 10 MMOL/L — SIGNIFICANT CHANGE UP (ref 5–17)
BUN SERPL-MCNC: 22 MG/DL — SIGNIFICANT CHANGE UP (ref 7–23)
CALCIUM SERPL-MCNC: 9.1 MG/DL — SIGNIFICANT CHANGE UP (ref 8.4–10.5)
CHLORIDE SERPL-SCNC: 96 MMOL/L — SIGNIFICANT CHANGE UP (ref 96–108)
CO2 SERPL-SCNC: 28 MMOL/L — SIGNIFICANT CHANGE UP (ref 22–31)
CREAT SERPL-MCNC: 0.89 MG/DL — SIGNIFICANT CHANGE UP (ref 0.5–1.3)
EGFR: 102 ML/MIN/1.73M2 — SIGNIFICANT CHANGE UP
GLUCOSE BLDC GLUCOMTR-MCNC: 108 MG/DL — HIGH (ref 70–99)
GLUCOSE BLDC GLUCOMTR-MCNC: 132 MG/DL — HIGH (ref 70–99)
GLUCOSE BLDC GLUCOMTR-MCNC: 205 MG/DL — HIGH (ref 70–99)
GLUCOSE BLDC GLUCOMTR-MCNC: 310 MG/DL — HIGH (ref 70–99)
GLUCOSE SERPL-MCNC: 220 MG/DL — HIGH (ref 70–99)
HCT VFR BLD CALC: 40.7 % — SIGNIFICANT CHANGE UP (ref 39–50)
HGB BLD-MCNC: 13.4 G/DL — SIGNIFICANT CHANGE UP (ref 13–17)
MAGNESIUM SERPL-MCNC: 2.1 MG/DL — SIGNIFICANT CHANGE UP (ref 1.6–2.6)
MCHC RBC-ENTMCNC: 30.2 PG — SIGNIFICANT CHANGE UP (ref 27–34)
MCHC RBC-ENTMCNC: 32.9 GM/DL — SIGNIFICANT CHANGE UP (ref 32–36)
MCV RBC AUTO: 91.7 FL — SIGNIFICANT CHANGE UP (ref 80–100)
NRBC # BLD: 0 /100 WBCS — SIGNIFICANT CHANGE UP (ref 0–0)
PLATELET # BLD AUTO: 211 K/UL — SIGNIFICANT CHANGE UP (ref 150–400)
POTASSIUM SERPL-MCNC: 4.4 MMOL/L — SIGNIFICANT CHANGE UP (ref 3.5–5.3)
POTASSIUM SERPL-SCNC: 4.4 MMOL/L — SIGNIFICANT CHANGE UP (ref 3.5–5.3)
RBC # BLD: 4.44 M/UL — SIGNIFICANT CHANGE UP (ref 4.2–5.8)
RBC # FLD: 12.9 % — SIGNIFICANT CHANGE UP (ref 10.3–14.5)
SODIUM SERPL-SCNC: 134 MMOL/L — LOW (ref 135–145)
WBC # BLD: 7.92 K/UL — SIGNIFICANT CHANGE UP (ref 3.8–10.5)
WBC # FLD AUTO: 7.92 K/UL — SIGNIFICANT CHANGE UP (ref 3.8–10.5)

## 2023-09-17 PROCEDURE — 99253 IP/OBS CNSLTJ NEW/EST LOW 45: CPT

## 2023-09-17 PROCEDURE — 99223 1ST HOSP IP/OBS HIGH 75: CPT

## 2023-09-17 RX ORDER — FUROSEMIDE 40 MG
20 TABLET ORAL DAILY
Refills: 0 | Status: DISCONTINUED | OUTPATIENT
Start: 2023-09-17 | End: 2023-09-19

## 2023-09-17 RX ORDER — HYDRALAZINE HCL 50 MG
25 TABLET ORAL EVERY 8 HOURS
Refills: 0 | Status: DISCONTINUED | OUTPATIENT
Start: 2023-09-17 | End: 2023-09-19

## 2023-09-17 RX ORDER — CARVEDILOL PHOSPHATE 80 MG/1
6.25 CAPSULE, EXTENDED RELEASE ORAL EVERY 12 HOURS
Refills: 0 | Status: DISCONTINUED | OUTPATIENT
Start: 2023-09-17 | End: 2023-09-19

## 2023-09-17 RX ORDER — HYDRALAZINE HCL 50 MG
10 TABLET ORAL EVERY 8 HOURS
Refills: 0 | Status: DISCONTINUED | OUTPATIENT
Start: 2023-09-17 | End: 2023-09-17

## 2023-09-17 RX ADMIN — Medication 10 MILLIGRAM(S): at 13:02

## 2023-09-17 RX ADMIN — ZOLPIDEM TARTRATE 5 MILLIGRAM(S): 10 TABLET ORAL at 21:30

## 2023-09-17 RX ADMIN — Medication 5 UNIT(S): at 11:34

## 2023-09-17 RX ADMIN — SODIUM CHLORIDE 3 MILLILITER(S): 9 INJECTION INTRAMUSCULAR; INTRAVENOUS; SUBCUTANEOUS at 06:02

## 2023-09-17 RX ADMIN — Medication 4: at 17:14

## 2023-09-17 RX ADMIN — Medication 8: at 11:34

## 2023-09-17 RX ADMIN — SODIUM CHLORIDE 3 MILLILITER(S): 9 INJECTION INTRAMUSCULAR; INTRAVENOUS; SUBCUTANEOUS at 21:46

## 2023-09-17 RX ADMIN — Medication 25 MILLIGRAM(S): at 21:30

## 2023-09-17 RX ADMIN — ATORVASTATIN CALCIUM 80 MILLIGRAM(S): 80 TABLET, FILM COATED ORAL at 21:31

## 2023-09-17 RX ADMIN — CARVEDILOL PHOSPHATE 6.25 MILLIGRAM(S): 80 CAPSULE, EXTENDED RELEASE ORAL at 17:13

## 2023-09-17 RX ADMIN — HEPARIN SODIUM 5000 UNIT(S): 5000 INJECTION INTRAVENOUS; SUBCUTANEOUS at 13:02

## 2023-09-17 RX ADMIN — Medication 1000 MILLIGRAM(S): at 22:04

## 2023-09-17 RX ADMIN — Medication 81 MILLIGRAM(S): at 11:34

## 2023-09-17 RX ADMIN — Medication 25 MILLIGRAM(S): at 13:02

## 2023-09-17 RX ADMIN — INSULIN GLARGINE 15 UNIT(S): 100 INJECTION, SOLUTION SUBCUTANEOUS at 21:54

## 2023-09-17 RX ADMIN — Medication 5 UNIT(S): at 17:14

## 2023-09-17 RX ADMIN — Medication 5 UNIT(S): at 07:10

## 2023-09-17 RX ADMIN — Medication 1000 MILLIGRAM(S): at 21:34

## 2023-09-17 RX ADMIN — PANTOPRAZOLE SODIUM 40 MILLIGRAM(S): 20 TABLET, DELAYED RELEASE ORAL at 06:04

## 2023-09-17 RX ADMIN — Medication 20 MILLIGRAM(S): at 11:33

## 2023-09-17 RX ADMIN — Medication 25 MILLIGRAM(S): at 05:48

## 2023-09-17 RX ADMIN — ISOSORBIDE MONONITRATE 30 MILLIGRAM(S): 60 TABLET, EXTENDED RELEASE ORAL at 11:34

## 2023-09-17 RX ADMIN — HEPARIN SODIUM 5000 UNIT(S): 5000 INJECTION INTRAVENOUS; SUBCUTANEOUS at 21:31

## 2023-09-17 RX ADMIN — SODIUM CHLORIDE 3 MILLILITER(S): 9 INJECTION INTRAMUSCULAR; INTRAVENOUS; SUBCUTANEOUS at 14:14

## 2023-09-17 RX ADMIN — HEPARIN SODIUM 5000 UNIT(S): 5000 INJECTION INTRAVENOUS; SUBCUTANEOUS at 05:48

## 2023-09-17 NOTE — CONSULT NOTE ADULT - TIME BILLING
medication reconciliation; review of history, interval symptoms. diabetes education; patient education and care team coordination

## 2023-09-17 NOTE — CONSULT NOTE ADULT - SUBJECTIVE AND OBJECTIVE BOX
HPI:  Patient is a 52 y/o male, smoker (5 cigarettes per day x 15 years), with no reported PMH or PSH who presented to [facility] for 3 weeks of progressive shortness of breath, TREADWELL, a cough and bilateral LE edema. He never experienced chest pain.  At OSH, patient underwent a cardiac catheterization which revealed obstructive LAD disease for which he was referred to Dr. Naylor. He was transferred to [facility] on 9/15/23 for CABG evaluation. HF consulted for optimization.    Patient notes that he recently came back from vacation and noticed swelling in his legs, thought it was due to covid. Otherwise, denies any orthopnea, PND, chest pain, history of CAD or cardiomyopathy, and any family history of cardiomyopathy. He is a smoker but denies etoh use or recreational drug use. Denies being on any medications.         ROS: A 10-point review of systems was otherwise negative.    PAST MEDICAL & SURGICAL HISTORY:  No pertinent past medical history      No significant past surgical history          SOCIAL HISTORY:  FAMILY HISTORY:  No pertinent family history in first degree relatives        ALLERGIES: 	  No Known Allergies            MEDICATIONS:  acetaminophen     Tablet .. 1000 milliGRAM(s) Oral every 6 hours PRN  albuterol/ipratropium for Nebulization 3 milliLiter(s) Nebulizer every 6 hours PRN  aspirin enteric coated 81 milliGRAM(s) Oral daily  atorvastatin 80 milliGRAM(s) Oral at bedtime  dextrose 5%. 1000 milliLiter(s) IV Continuous <Continuous>  dextrose 5%. 1000 milliLiter(s) IV Continuous <Continuous>  dextrose 50% Injectable 25 Gram(s) IV Push once  dextrose 50% Injectable 12.5 Gram(s) IV Push once  dextrose 50% Injectable 25 Gram(s) IV Push once  dextrose Oral Gel 15 Gram(s) Oral once PRN  furosemide    Tablet 20 milliGRAM(s) Oral daily  glucagon  Injectable 1 milliGRAM(s) IntraMuscular once  glucagon  Injectable 1 milliGRAM(s) IntraMuscular once  heparin   Injectable 5000 Unit(s) SubCutaneous every 8 hours  hydrALAZINE 10 milliGRAM(s) Oral every 8 hours  influenza   Vaccine 0.5 milliLiter(s) IntraMuscular once  insulin glargine Injectable (LANTUS) 15 Unit(s) SubCutaneous at bedtime  insulin lispro (ADMELOG) corrective regimen sliding scale   SubCutaneous Before meals and at bedtime  insulin lispro Injectable (ADMELOG) 5 Unit(s) SubCutaneous three times a day before meals  isosorbide   mononitrate ER Tablet (IMDUR) 30 milliGRAM(s) Oral daily  metoprolol tartrate 25 milliGRAM(s) Oral every 8 hours  pantoprazole    Tablet 40 milliGRAM(s) Oral before breakfast  sodium chloride 0.9% lock flush 3 milliLiter(s) IV Push every 8 hours  zolpidem 5 milliGRAM(s) Oral at bedtime PRN      HOME MEDICATIONS:      PHYSICAL EXAM:      I/O Summary 24H    IN: 180 mL / OUT: 0 mL / NET: 180 mL        T(F): 97.1 (09-17-23 @ 09:48), Max: 97.6 (09-16-23 @ 16:54)  HR: 100 (09-17-23 @ 09:05) (82 - 100)  BP: 152/100 (09-17-23 @ 09:05) (124/75 - 161/112)  BP(mean): 121 (09-17-23 @ 09:05) (94 - 131)  ABP: --  ABP(mean): --  RR: 18 (09-17-23 @ 09:05) (16 - 18)  SpO2: 95% (09-17-23 @ 09:05) (93% - 96%)    GEN: Awake, comfortable. NAD.   HEENT: NCAT, PERRL, EOMI. Mucosa moist. No JVD.   RESP: CTA b/l  CV: RRR, normal s1/s2. No m/r/g.  ABD: Soft, NTND. BS+  EXT: Warm. No edema, clubbing, or cyanosis.   NEURO: AAOx3. No focal deficits.      	  LABS:	 	    CARDIAC MARKERS:              CBC 09-16-23 @ 06:57                        12.9   7.52  )-----------( 217                   39.4       Hgb trend: 12.9 <-- , 13.0 <--   WBC trend: 7.52 <-- , 8.97 <--     CMP 09-16-23 @ 06:57    138  |  98  |  22  ----------------------------<  268<H>  3.4<L>   |  30  |  0.79    Mg     2.1     09-16    TPro  6.7  /  Alb  3.3  /  TBili  0.8  /  DBili  x   /  AST  29  /  ALT  24  /  AlkPhos  100  09-16      Serum Cr trend: 0.79 <-- , 0.97 <--   proBNP:   Lipid Profile:   HgA1c:   TSH:     TELEMETRY: 	    ECG:  	  RADIOLOGY:   ECHO:  STRESS:  CATH:  
Patient is a 52 yo man smoker here for chest pain and CAD. Endocrine is asked to consult for diabetes. The patient denies any history of diabetes but also has not been seen by a doctor in over 2 years.  On admission, A1c is 8.8%.  He states that he eats healthy and does not provide significant details into day-to-day diet  Breakfast: eggs  Lunch: rice and beans  Dinner: vegetables  Reportedly quit smoking about two months ago  Works in a [employer]    PAST MEDICAL & SURGICAL HISTORY:  No pertinent past medical history  No significant past surgical history    FAMILY HISTORY:  Mother: HTN and diabetes  Father  at a young age    Social History:  Former? smoker  Social alcohol  No recreational drugs     Outpatient Medications:  None    MEDICATIONS  (STANDING):  aspirin enteric coated 81 milliGRAM(s) Oral daily  atorvastatin 80 milliGRAM(s) Oral at bedtime  dextrose 5%. 1000 milliLiter(s) (50 mL/Hr) IV Continuous <Continuous>  dextrose 5%. 1000 milliLiter(s) (100 mL/Hr) IV Continuous <Continuous>  dextrose 50% Injectable 25 Gram(s) IV Push once  dextrose 50% Injectable 12.5 Gram(s) IV Push once  dextrose 50% Injectable 25 Gram(s) IV Push once  furosemide    Tablet 20 milliGRAM(s) Oral daily  glucagon  Injectable 1 milliGRAM(s) IntraMuscular once  glucagon  Injectable 1 milliGRAM(s) IntraMuscular once  heparin   Injectable 5000 Unit(s) SubCutaneous every 8 hours  hydrALAZINE 10 milliGRAM(s) Oral every 8 hours  influenza   Vaccine 0.5 milliLiter(s) IntraMuscular once  insulin glargine Injectable (LANTUS) 15 Unit(s) SubCutaneous at bedtime  insulin lispro (ADMELOG) corrective regimen sliding scale   SubCutaneous Before meals and at bedtime  insulin lispro Injectable (ADMELOG) 5 Unit(s) SubCutaneous three times a day before meals  isosorbide   mononitrate ER Tablet (IMDUR) 30 milliGRAM(s) Oral daily  metoprolol tartrate 25 milliGRAM(s) Oral every 8 hours  pantoprazole    Tablet 40 milliGRAM(s) Oral before breakfast  sodium chloride 0.9% lock flush 3 milliLiter(s) IV Push every 8 hours    MEDICATIONS  (PRN):  acetaminophen     Tablet .. 1000 milliGRAM(s) Oral every 6 hours PRN Temp greater or equal to 38C (100.4F), Mild Pain (1 - 3)  albuterol/ipratropium for Nebulization 3 milliLiter(s) Nebulizer every 6 hours PRN Shortness of Breath and/or Wheezing  dextrose Oral Gel 15 Gram(s) Oral once PRN Blood Glucose LESS THAN 70 milliGRAM(s)/deciliter  zolpidem 5 milliGRAM(s) Oral at bedtime PRN Insomnia      Allergies  No Known Allergies    Review of Systems:  Constitutional: No fever  Eyes: No blurry vision  Neuro: No tremors  HEENT: No pain  Cardiovascular: + chest pain  Respiratory: No SOB, no cough  GI: No nausea, vomiting, abdominal pain  : No dysuria  Skin: no rash  Psych: no depression  ALL OTHER SYSTEMS REVIEWED AND NEGATIVE    PHYSICAL EXAM:  VITALS: T(C): 36.2 (23 @ 09:48)  T(F): 97.1 (23 @ 09:48), Max: 97.6 (23 @ 16:54)  HR: 100 (23 @ 09:05) (82 - 100)  BP: 152/100 (23 @ 09:05) (124/75 - 161/112)  RR:  (16 - 18)  SpO2:  (93% - 96%)  Wt(kg): --  GENERAL: NAD, well-groomed, well-developed  EYES: No proptosis, no lid lag, anicteric  HEENT:  Atraumatic, Normocephalic, moist mucous membranes  RESPIRATORY: Respirations are even and unlabored  CARDIOVASCULAR: Regular rate   GI: Soft, nontender  SKIN: Dry, intact, No rashes or lesions; no foot ulcers  MUSCULOSKELETAL: moves all extremities  PSYCH: Alert and oriented x 3, reactive affect, normal mood      POCT Blood Glucose.: 132 mg/dL (23 @ 06:53)  POCT Blood Glucose.: 135 mg/dL (23 @ 21:51)  POCT Blood Glucose.: 265 mg/dL (23 @ 16:55)  POCT Blood Glucose.: 162 mg/dL (23 @ 12:06)  POCT Blood Glucose.: 234 mg/dL (23 @ 07:24)  POCT Blood Glucose.: 211 mg/dL (09-15-23 @ 21:49)                            12.9   7.52  )-----------( 217      ( 16 Sep 2023 06:57 )             39.4           138  |  98  |  22  ----------------------------<  268<H>  3.4<L>   |  30  |  0.79    eGFR: 106    Ca    9.1        Mg     2.1         TPro  6.7  /  Alb  3.3  /  TBili  0.8  /  DBili  x   /  AST  29  /  ALT  24  /  AlkPhos  100        Thyroid Function Tests:  09-15 @ 21:26 TSH 0.723 FreeT4 -- T3 -- Anti TPO -- Anti Thyroglobulin Ab -- TSI --     Chol 92 Direct LDL -- LDL calculated 38 HDL 28<L> Trig 131

## 2023-09-17 NOTE — CONSULT NOTE ADULT - PROBLEM SELECTOR RECOMMENDATION 9
-patient with newly diagnosed uncontrolled diabetes in the setting of chest pain and CAD  -attempted to discuss diet with the patient but he states he knows what healthy diet is.  Tried to review the healthy plate diet with him but again, he states he knows this already  -at this time, he can continue with basal/bolus insulin and sliding scale as ordered. The recs were provided yesterday and glucose levels are at goal  -hypoglycemia protocol  -sliding scale  -consistent carbohydrate diet  -outpatient: TBD, anticipate possible basal + orals or basal/bolus depending on his glycemic response in the hospital  -nutrition consult recommended but he is not engaged at this time to discuss this in detail  -requires dilated eye exam in the outpatient setting

## 2023-09-17 NOTE — PROGRESS NOTE ADULT - ASSESSMENT
54 yo M smoker (5 cigarettes per day x 15 years), with no reported PMH or PSH who presented to [facility] for 3 weeks of progressive shortness of breath, TREADWELL, a cough and bilateral LE edema. At OSH, patient underwent a cardiac catheterization which revealed obstructive LAD disease for which he was referred to Dr. Naylor and transferred to [facility] on 9/15/23 for CABG evaluation. 9/16 BP elevated, up-titrate BB and start hydralazine. Echo w/ LVEF 10-15%. A1c 8.8. Endocrine consulted. Will consult HF in AM. Tx to 9LA for further management.     9/16 BP improved. PO lasix started. HF following, increased hydralazine to 25mg     Plan:    Neurovascular:   -Pain well controlled with current regimen.   -PRN's: tylenol     Cardiovascular:   CAD  - obstructive LAD disease per cath from OSH; undergoing w/up for midCAB  - on ASA, statin, BB, imdur    Acute on chronic systolic HF // NICM   - LVEF 10-15% per echo today   - mildly volume up on exam, IV lasix 20mg x 1 and monitor output, consult HF in AM for optimization of GDMT   Mild to moderate MR   - per echo today   HTN, uncontrolled   - uptitrate BB, IV hydralazine 10mg TID   - Telemetry monitoring    Respiratory:   Cough, resolved   - COVID/RVP negative at OSH  - PRN duonebs for SOB  -Oxygenating well on room air  -Encourage continued use of IS 10x/hr and frequent ambulation  -CXR: Right effusion, diuresis     GI:  -GI PPX: protonix   -PO Diet  -Bowel Regimen: senna/miralax     Renal / :  Hypokalemia   - K+ 3.4, replete per protocol   -Continue to monitor renal function: BUN/Cr: 22/0.79  -Monitor I/O's daily     Endocrine:    DM II (not on home meds)   -A1c: 8.8%; started Lantus 15, Lispro 5, SSI, CC diet   No hx of thyroid disease   -TSH: 0.723    Hematologic:  -no overt s/s of active bleeding   -CBC: H/H- 12.9/39.4  -Coagulation Panel.    ID:  -Temperature: afebrile   -CBC: WBC-7.52  -Continue to observe for SIRS/Sepsis Syndrome.    Prophylaxis:  -DVT prophylaxis with 5000 SubQ Heparin q8h.  -Continue with SCD's b/l while patient is at rest     Disposition:  Preop w/up for MIDCAB in the setting of obstructive LAD disease   TTE today w/ LVEF 10-15%, consult HF in AM   Endocrine consulted, started on Lantus/Lispro   Uncontrolled HTN, increase BB to 25mg q8, start hydralazine and uptitrate as tolerated      52 yo M smoker (5 cigarettes per day x 15 years), with no reported PMH or PSH who presented to [facility] for 3 weeks of progressive shortness of breath, TREADWELL, a cough and bilateral LE edema. At OSH, patient underwent a cardiac catheterization which revealed obstructive LAD disease for which he was referred to Dr. Naylor and transferred to [facility] on 9/15/23 for CABG evaluation. 9/16 BP elevated, up-titrate BB and start hydralazine. Echo w/ LVEF 10-15%. A1c 8.8. Endocrine consulted. Will consult HF in AM. Tx to 9LA for further management.     9/16 BP improved. PO lasix started. HF following, increased hydralazine to 25mg and adjust BB dose.     Plan:    Neurovascular:   -Pain well controlled with current regimen.   -PRN's: tylenol     Cardiovascular:   CAD  - obstructive LAD disease per cath from OSH; undergoing w/up for midCAB  - on ASA, statin, BB, imdur    Acute on chronic systolic HF // NICM   - LVEF 10-15% per echo today   - mildly volume up on exam, PO lasix started, HF following, increased hydralazine and adjust BB dose  Mild to Moderate MR   - per echo this admission   HTN, uncontrolled   - uptitrate BB, IV hydralazine 10mg TID   - Telemetry monitoring    Respiratory:   Cough, resolved   - COVID/RVP negative at OSH  - PRN duonebs for SOB  -Oxygenating well on room air  -Encourage continued use of IS 10x/hr and frequent ambulation  -CXR: Right effusion, diuresis     GI:  -GI PPX: protonix   -PO Diet  -Bowel Regimen: senna/miralax     Renal / :  Hypokalemia   - K+ 3.4, replete per protocol   -Continue to monitor renal function: BUN/Cr: 22/0.79  -Monitor I/O's daily     Endocrine:    DM II (not on home meds)   -A1c: 8.8%; started Lantus 15, Lispro 5, SSI, CC diet   No hx of thyroid disease   -TSH: 0.723    Hematologic:  -no overt s/s of active bleeding   -CBC: H/H- 12.9/39.4  -Coagulation Panel.    ID:  -Temperature: afebrile   -CBC: WBC-7.52  -Continue to observe for SIRS/Sepsis Syndrome.    Prophylaxis:  -DVT prophylaxis with 5000 SubQ Heparin q8h.  -Continue with SCD's b/l while patient is at rest     Disposition:  Preop w/up for MIDCAB in the setting of obstructive LAD disease   TTE today w/ LVEF 10-15%, consult HF in AM   Endocrine consulted, started on Lantus/Lispro   Uncontrolled HTN, increase BB to 25mg q8, start hydralazine and uptitrate as tolerated      52 yo M smoker (5 cigarettes per day x 15 years), with no reported PMH or PSH who presented to [facility] for 3 weeks of progressive shortness of breath, TREADWELL, a cough and bilateral LE edema. At OSH, patient underwent a cardiac catheterization which revealed obstructive LAD disease for which he was referred to Dr. Naylor and transferred to [facility] on 9/15/23 for CABG evaluation. 9/16 BP elevated, up-titrate BB and start hydralazine. Echo w/ LVEF 10-15%. A1c 8.8. Endocrine consulted. Will consult HF in AM. Tx to 9LA for further management.     9/16 BP improved. PO lasix started. HF following and optimizing GDMT.     Plan:    Neurovascular:   -Pain well controlled with current regimen.   -PRN's: tylenol     Cardiovascular:   CAD  - obstructive LAD disease per cath from OSH; undergoing w/up for midCAB  - on ASA, statin, BB, imdur    Acute on chronic systolic HF // NICM   - LVEF 10-15% per echo   - mildly volume up on exam, PO lasix started  - HF following, increase hydralazine from 10mg TID to 25mg TID and continue imdur 30mg QD, switch metoprolol to coreg 6.25mg BID; continue lasix 20mg PO QD  Mild to Moderate MR   - per echo this admission   HTN, uncontrolled   - uptitrate BB, IV hydralazine 10mg TID   - Telemetry monitoring    Respiratory:   Cough, resolved   - COVID/RVP negative at OSH  - PRN duonebs for SOB  -Oxygenating well on room air  -Encourage continued use of IS 10x/hr and frequent ambulation  -CXR: Right effusion, diuresis     GI:  -GI PPX: protonix   -PO Diet  -Bowel Regimen: senna/miralax     Renal / :  -Continue to monitor renal function: BUN/Cr: 22/0.79  -Monitor I/O's daily     Endocrine:    DM II (not on home meds)   -A1c: 8.8%; started Lantus 15, Lispro 5, SSI, CC diet   No hx of thyroid disease   -TSH: 0.723    Hematologic:  -no overt s/s of active bleeding   -CBC: H/H- 12.9/39.4  -Coagulation Panel.    ID:  -Temperature: afebrile   -CBC: WBC-7.52  -Continue to observe for SIRS/Sepsis Syndrome.    Prophylaxis:  -DVT prophylaxis with 5000 SubQ Heparin q8h.  -Continue with SCD's b/l while patient is at rest     Disposition:  Preop w/up for MIDCAB in the setting of obstructive LAD disease   TTE yesterday w/ LVEF 10-15%, HF following and optimizing GDMT   Endocrine managing DM regimen    52 yo M smoker (5 cigarettes per day x 15 years), with no reported PMH or PSH who presented to [facility] for 3 weeks of progressive shortness of breath, TREADWELL, a cough and bilateral LE edema. At OSH, patient underwent a cardiac catheterization which revealed obstructive LAD disease for which he was referred to Dr. Naylor and transferred to [facility] on 9/15/23 for CABG evaluation. 9/16 BP elevated, up-titrate BB and start hydralazine. Echo w/ LVEF 10-15%. A1c 8.8. Endocrine consulted. Will consult HF in AM. Tx to 9LA for further management.     9/16 BP improved. PO lasix started. HF following and optimizing GDMT.     Plan:    Neurovascular:   -Pain well controlled with current regimen.   -PRN's: tylenol     Cardiovascular:   CAD  - obstructive LAD disease per cath from OSH; undergoing w/up for midCAB  - on ASA, statin, BB, imdur    Acute on chronic systolic HF // NICM   - LVEF 10-15% per echo   - mildly volume up on exam, PO lasix started  - HF following, increase hydralazine from 10mg TID to 25mg TID and continue imdur 30mg QD, switch metoprolol to coreg 6.25mg BID; continue lasix 20mg PO QD  Mild to Moderate MR   - per echo this admission   HTN, uncontrolled   - uptitrate BB, IV hydralazine 10mg TID   - Telemetry monitoring    Respiratory:   Cough, resolved   - COVID/RVP negative at OSH  - PRN duonebs for SOB  -Oxygenating well on room air  -Encourage continued use of IS 10x/hr and frequent ambulation  -CXR: Right effusion, diuresis     GI:  -GI PPX: protonix   -PO Diet  -Bowel Regimen: senna/miralax     Renal / :  -Continue to monitor renal function: BUN/Cr: 22/0.79  -Monitor I/O's daily     Endocrine:    DM II (not on home meds)   -A1c: 8.8%; started Lantus 15, Lispro 5, SSI, CC diet   No hx of thyroid disease   -TSH: 0.723    Hematologic:  -no overt s/s of active bleeding   -CBC: H/H- 12.9/39.4  -Coagulation Panel.    ID:  -Temperature: afebrile   -CBC: WBC-7.52  -Continue to observe for SIRS/Sepsis Syndrome.    Prophylaxis:  -DVT prophylaxis with 5000 SubQ Heparin q8h.  -Continue with SCD's b/l while patient is at rest     Disposition:  Preop w/up for MIDCAB in the setting of obstructive LAD disease   TTE yesterday w/ LVEF 10-15%, HF following and optimizing GDMT   Endocrine managing DM regimen   Viability study ordered

## 2023-09-17 NOTE — PROGRESS NOTE ADULT - SUBJECTIVE AND OBJECTIVE BOX
Patient discussed on morning rounds with       OPERATION & DATE:    SUBJECTIVE ASSESSMENT:    VITAL SIGNS:  Vital Signs Last 24 Hrs  T(C): 36.1 (17 Sep 2023 12:46), Max: 36.4 (16 Sep 2023 16:54)  T(F): 97 (17 Sep 2023 12:46), Max: 97.6 (16 Sep 2023 16:54)  HR: 100 (17 Sep 2023 12:38) (82 - 100)  BP: 164/97 (17 Sep 2023 12:38) (124/75 - 164/97)  BP(mean): 123 (17 Sep 2023 12:38) (94 - 131)  RR: 18 (17 Sep 2023 12:38) (18 - 18)  SpO2: 95% (17 Sep 2023 12:38) (94% - 95%)    Parameters below as of 17 Sep 2023 12:38  Patient On (Oxygen Delivery Method): room air      I&O's Detail    16 Sep 2023 07:01  -  17 Sep 2023 07:00  --------------------------------------------------------  IN:    Oral Fluid: 180 mL  Total IN: 180 mL    OUT:  Total OUT: 0 mL    Total NET: 180 mL        CHEST TUBE:    PACO DRAIN:    EPICARDIAL WIRES:   STITCHES:  STAPLES:  RICH:   CENTRAL LINE:  MIDLINE/PICC:  WOUND VAC:    PHYSICAL EXAM:  General:  HEENT:  Cardio:  Pulm:  GI:  Extremities:  Vascular:  Incisions:    LABS:                        13.4   7.92  )-----------( 211      ( 17 Sep 2023 15:42 )             40.7     PT/INR - ( 15 Sep 2023 21:26 )   PT: 14.1 sec;   INR: 1.24          PTT - ( 15 Sep 2023 21:26 )  PTT:34.2 sec  09-17    134<L>  |  96  |  22  ----------------------------<  220<H>  4.4   |  28  |  0.89    Ca    9.1      17 Sep 2023 15:42  Mg     2.1     09-17    TPro  6.7  /  Alb  3.3  /  TBili  0.8  /  DBili  x   /  AST  29  /  ALT  24  /  AlkPhos  100  09-16    Urinalysis Basic - ( 17 Sep 2023 15:42 )    Color: x / Appearance: x / SG: x / pH: x  Gluc: 220 mg/dL / Ketone: x  / Bili: x / Urobili: x   Blood: x / Protein: x / Nitrite: x   Leuk Esterase: x / RBC: x / WBC x   Sq Epi: x / Non Sq Epi: x / Bacteria: x      MEDICATIONS  (STANDING):  aspirin enteric coated 81 milliGRAM(s) Oral daily  atorvastatin 80 milliGRAM(s) Oral at bedtime  carvedilol 6.25 milliGRAM(s) Oral every 12 hours  dextrose 5%. 1000 milliLiter(s) (50 mL/Hr) IV Continuous <Continuous>  dextrose 5%. 1000 milliLiter(s) (100 mL/Hr) IV Continuous <Continuous>  dextrose 50% Injectable 25 Gram(s) IV Push once  dextrose 50% Injectable 12.5 Gram(s) IV Push once  dextrose 50% Injectable 25 Gram(s) IV Push once  furosemide    Tablet 20 milliGRAM(s) Oral daily  glucagon  Injectable 1 milliGRAM(s) IntraMuscular once  glucagon  Injectable 1 milliGRAM(s) IntraMuscular once  heparin   Injectable 5000 Unit(s) SubCutaneous every 8 hours  hydrALAZINE 25 milliGRAM(s) Oral every 8 hours  influenza   Vaccine 0.5 milliLiter(s) IntraMuscular once  insulin glargine Injectable (LANTUS) 15 Unit(s) SubCutaneous at bedtime  insulin lispro (ADMELOG) corrective regimen sliding scale   SubCutaneous Before meals and at bedtime  insulin lispro Injectable (ADMELOG) 5 Unit(s) SubCutaneous three times a day before meals  isosorbide   mononitrate ER Tablet (IMDUR) 30 milliGRAM(s) Oral daily  pantoprazole    Tablet 40 milliGRAM(s) Oral before breakfast  sodium chloride 0.9% lock flush 3 milliLiter(s) IV Push every 8 hours    MEDICATIONS  (PRN):  acetaminophen     Tablet .. 1000 milliGRAM(s) Oral every 6 hours PRN Temp greater or equal to 38C (100.4F), Mild Pain (1 - 3)  albuterol/ipratropium for Nebulization 3 milliLiter(s) Nebulizer every 6 hours PRN Shortness of Breath and/or Wheezing  dextrose Oral Gel 15 Gram(s) Oral once PRN Blood Glucose LESS THAN 70 milliGRAM(s)/deciliter  zolpidem 5 milliGRAM(s) Oral at bedtime PRN Insomnia    RADIOLOGY & ADDITIONAL TESTS:    RADIOLOGY & ADDITIONAL TESTS:    TTE (9/16/2023)   1. Moderately dilated left ventricular size.   2. Severely reduced left ventricular systolic function, LVEF 10-15%.   3. Grade III left ventricular diastolic dysfunction.   4. Mildly dilated right ventricular size.   5. Moderately reduced right ventricular systolic function.   6. Biatrial enlargement.   7. Mild-to-moderate mitral regurgitation.   8. Pulmonary hypertension present, pulmonary artery systolic pressure is 60 mmHg.   9. Trivial pericardial effusion.  10. No prior echo is available for comparison.    Carotid US 9/16/2023  IMPRESSION: No hemodynamically significant stenosis of either carotid artery.     Patient discussed on morning rounds with Dr. Waggoner      OPERATION & DATE: Preoperative w/up for MIDCAB    SUBJECTIVE ASSESSMENT: Seen resting in bed. Denies current chest pain. Admits to TREADWELL.     VITAL SIGNS:  Vital Signs Last 24 Hrs  T(C): 36.1 (17 Sep 2023 12:46), Max: 36.4 (16 Sep 2023 16:54)  T(F): 97 (17 Sep 2023 12:46), Max: 97.6 (16 Sep 2023 16:54)  HR: 100 (17 Sep 2023 12:38) (82 - 100)  BP: 164/97 (17 Sep 2023 12:38) (124/75 - 164/97)  BP(mean): 123 (17 Sep 2023 12:38) (94 - 131)  RR: 18 (17 Sep 2023 12:38) (18 - 18)  SpO2: 95% (17 Sep 2023 12:38) (94% - 95%)    Parameters below as of 17 Sep 2023 12:38  Patient On (Oxygen Delivery Method): room air      I&O's Detail    16 Sep 2023 07:01  -  17 Sep 2023 07:00  --------------------------------------------------------  IN:    Oral Fluid: 180 mL  Total IN: 180 mL    OUT:  Total OUT: 0 mL    Total NET: 180 mL        CHEST TUBE:    PACO DRAIN:    EPICARDIAL WIRES:   STITCHES:  STAPLES:  RICH:   CENTRAL LINE:  MIDLINE/PICC:  WOUND VAC:    PHYSICAL EXAM:  General:  HEENT:  Cardio:  Pulm:  GI:  Extremities:  Vascular:  Incisions:    LABS:                        13.4   7.92  )-----------( 211      ( 17 Sep 2023 15:42 )             40.7     PT/INR - ( 15 Sep 2023 21:26 )   PT: 14.1 sec;   INR: 1.24          PTT - ( 15 Sep 2023 21:26 )  PTT:34.2 sec  09-17    134<L>  |  96  |  22  ----------------------------<  220<H>  4.4   |  28  |  0.89    Ca    9.1      17 Sep 2023 15:42  Mg     2.1     09-17    TPro  6.7  /  Alb  3.3  /  TBili  0.8  /  DBili  x   /  AST  29  /  ALT  24  /  AlkPhos  100  09-16    Urinalysis Basic - ( 17 Sep 2023 15:42 )    Color: x / Appearance: x / SG: x / pH: x  Gluc: 220 mg/dL / Ketone: x  / Bili: x / Urobili: x   Blood: x / Protein: x / Nitrite: x   Leuk Esterase: x / RBC: x / WBC x   Sq Epi: x / Non Sq Epi: x / Bacteria: x      MEDICATIONS  (STANDING):  aspirin enteric coated 81 milliGRAM(s) Oral daily  atorvastatin 80 milliGRAM(s) Oral at bedtime  carvedilol 6.25 milliGRAM(s) Oral every 12 hours  dextrose 5%. 1000 milliLiter(s) (50 mL/Hr) IV Continuous <Continuous>  dextrose 5%. 1000 milliLiter(s) (100 mL/Hr) IV Continuous <Continuous>  dextrose 50% Injectable 25 Gram(s) IV Push once  dextrose 50% Injectable 12.5 Gram(s) IV Push once  dextrose 50% Injectable 25 Gram(s) IV Push once  furosemide    Tablet 20 milliGRAM(s) Oral daily  glucagon  Injectable 1 milliGRAM(s) IntraMuscular once  glucagon  Injectable 1 milliGRAM(s) IntraMuscular once  heparin   Injectable 5000 Unit(s) SubCutaneous every 8 hours  hydrALAZINE 25 milliGRAM(s) Oral every 8 hours  influenza   Vaccine 0.5 milliLiter(s) IntraMuscular once  insulin glargine Injectable (LANTUS) 15 Unit(s) SubCutaneous at bedtime  insulin lispro (ADMELOG) corrective regimen sliding scale   SubCutaneous Before meals and at bedtime  insulin lispro Injectable (ADMELOG) 5 Unit(s) SubCutaneous three times a day before meals  isosorbide   mononitrate ER Tablet (IMDUR) 30 milliGRAM(s) Oral daily  pantoprazole    Tablet 40 milliGRAM(s) Oral before breakfast  sodium chloride 0.9% lock flush 3 milliLiter(s) IV Push every 8 hours    MEDICATIONS  (PRN):  acetaminophen     Tablet .. 1000 milliGRAM(s) Oral every 6 hours PRN Temp greater or equal to 38C (100.4F), Mild Pain (1 - 3)  albuterol/ipratropium for Nebulization 3 milliLiter(s) Nebulizer every 6 hours PRN Shortness of Breath and/or Wheezing  dextrose Oral Gel 15 Gram(s) Oral once PRN Blood Glucose LESS THAN 70 milliGRAM(s)/deciliter  zolpidem 5 milliGRAM(s) Oral at bedtime PRN Insomnia    RADIOLOGY & ADDITIONAL TESTS:    RADIOLOGY & ADDITIONAL TESTS:    TTE (9/16/2023)   1. Moderately dilated left ventricular size.   2. Severely reduced left ventricular systolic function, LVEF 10-15%.   3. Grade III left ventricular diastolic dysfunction.   4. Mildly dilated right ventricular size.   5. Moderately reduced right ventricular systolic function.   6. Biatrial enlargement.   7. Mild-to-moderate mitral regurgitation.   8. Pulmonary hypertension present, pulmonary artery systolic pressure is 60 mmHg.   9. Trivial pericardial effusion.  10. No prior echo is available for comparison.    Carotid US 9/16/2023  IMPRESSION: No hemodynamically significant stenosis of either carotid artery.     Patient discussed on morning rounds with Dr. Waggoner      OPERATION & DATE: Preoperative w/up for MIDCAB    SUBJECTIVE ASSESSMENT: Seen resting in bed. Denies current chest pain. Admits to TREADWELL.     VITAL SIGNS:  Vital Signs Last 24 Hrs  T(C): 36.1 (17 Sep 2023 12:46), Max: 36.4 (16 Sep 2023 16:54)  T(F): 97 (17 Sep 2023 12:46), Max: 97.6 (16 Sep 2023 16:54)  HR: 100 (17 Sep 2023 12:38) (82 - 100)  BP: 164/97 (17 Sep 2023 12:38) (124/75 - 164/97)  BP(mean): 123 (17 Sep 2023 12:38) (94 - 131)  RR: 18 (17 Sep 2023 12:38) (18 - 18)  SpO2: 95% (17 Sep 2023 12:38) (94% - 95%)    Parameters below as of 17 Sep 2023 12:38  Patient On (Oxygen Delivery Method): room air    I&O's Detail    16 Sep 2023 07:01  -  17 Sep 2023 07:00  --------------------------------------------------------  IN:    Oral Fluid: 180 mL  Total IN: 180 mL    OUT:  Total OUT: 0 mL    Total NET: 180 mL    CHEST TUBE: n   PACO DRAIN: n   EPICARDIAL WIRES: n  STITCHES:n  STAPLES:n  RICH: n  CENTRAL LINE:n  MIDLINE/PICC:n  WOUND VAC:n    PHYSICAL EXAM:  General: NAD, sitting comfortably in chair, conversing appropriately  Neurological: alert and oriented, UE and LE strength equal b/l, facial symmetry present  Cardiovascular: RRR, Clear S1 and S2, no murmurs appreciated  Respiratory: chest expansion symmetrical, CTA b/l, no wheezing noted  Gastrointestinal: +BS, soft, NT, ND  Extremities: moving spontaneously, no calf tenderness or edema.  Vascular: warm, well perfused. DP/PT pulses palpable b/l.  Incisions: none     LABS:                        13.4   7.92  )-----------( 211      ( 17 Sep 2023 15:42 )             40.7     PT/INR - ( 15 Sep 2023 21:26 )   PT: 14.1 sec;   INR: 1.24          PTT - ( 15 Sep 2023 21:26 )  PTT:34.2 sec  09-17    134<L>  |  96  |  22  ----------------------------<  220<H>  4.4   |  28  |  0.89    Ca    9.1      17 Sep 2023 15:42  Mg     2.1     09-17    TPro  6.7  /  Alb  3.3  /  TBili  0.8  /  DBili  x   /  AST  29  /  ALT  24  /  AlkPhos  100  09-16    Urinalysis Basic - ( 17 Sep 2023 15:42 )    Color: x / Appearance: x / SG: x / pH: x  Gluc: 220 mg/dL / Ketone: x  / Bili: x / Urobili: x   Blood: x / Protein: x / Nitrite: x   Leuk Esterase: x / RBC: x / WBC x   Sq Epi: x / Non Sq Epi: x / Bacteria: x      MEDICATIONS  (STANDING):  aspirin enteric coated 81 milliGRAM(s) Oral daily  atorvastatin 80 milliGRAM(s) Oral at bedtime  carvedilol 6.25 milliGRAM(s) Oral every 12 hours  dextrose 5%. 1000 milliLiter(s) (50 mL/Hr) IV Continuous <Continuous>  dextrose 5%. 1000 milliLiter(s) (100 mL/Hr) IV Continuous <Continuous>  dextrose 50% Injectable 25 Gram(s) IV Push once  dextrose 50% Injectable 12.5 Gram(s) IV Push once  dextrose 50% Injectable 25 Gram(s) IV Push once  furosemide    Tablet 20 milliGRAM(s) Oral daily  glucagon  Injectable 1 milliGRAM(s) IntraMuscular once  glucagon  Injectable 1 milliGRAM(s) IntraMuscular once  heparin   Injectable 5000 Unit(s) SubCutaneous every 8 hours  hydrALAZINE 25 milliGRAM(s) Oral every 8 hours  influenza   Vaccine 0.5 milliLiter(s) IntraMuscular once  insulin glargine Injectable (LANTUS) 15 Unit(s) SubCutaneous at bedtime  insulin lispro (ADMELOG) corrective regimen sliding scale   SubCutaneous Before meals and at bedtime  insulin lispro Injectable (ADMELOG) 5 Unit(s) SubCutaneous three times a day before meals  isosorbide   mononitrate ER Tablet (IMDUR) 30 milliGRAM(s) Oral daily  pantoprazole    Tablet 40 milliGRAM(s) Oral before breakfast  sodium chloride 0.9% lock flush 3 milliLiter(s) IV Push every 8 hours    MEDICATIONS  (PRN):  acetaminophen     Tablet .. 1000 milliGRAM(s) Oral every 6 hours PRN Temp greater or equal to 38C (100.4F), Mild Pain (1 - 3)  albuterol/ipratropium for Nebulization 3 milliLiter(s) Nebulizer every 6 hours PRN Shortness of Breath and/or Wheezing  dextrose Oral Gel 15 Gram(s) Oral once PRN Blood Glucose LESS THAN 70 milliGRAM(s)/deciliter  zolpidem 5 milliGRAM(s) Oral at bedtime PRN Insomnia    RADIOLOGY & ADDITIONAL TESTS:    RADIOLOGY & ADDITIONAL TESTS:    TTE (9/16/2023)   1. Moderately dilated left ventricular size.   2. Severely reduced left ventricular systolic function, LVEF 10-15%.   3. Grade III left ventricular diastolic dysfunction.   4. Mildly dilated right ventricular size.   5. Moderately reduced right ventricular systolic function.   6. Biatrial enlargement.   7. Mild-to-moderate mitral regurgitation.   8. Pulmonary hypertension present, pulmonary artery systolic pressure is 60 mmHg.   9. Trivial pericardial effusion.  10. No prior echo is available for comparison.    Carotid US 9/16/2023  IMPRESSION: No hemodynamically significant stenosis of either carotid artery.

## 2023-09-17 NOTE — CONSULT NOTE ADULT - ATTENDING COMMENTS
-sCHF - newly dx'd systolic CHF 2/2 ischemic CM; TTE: LVEF 15-20%, Dilated RV, reduced RV function, biatrial dilation; Currently euvolemic on exam, HD stable. WWP  -Currently undergoing MIDCAB eval  -c/w ASA/Statin  -Can change Lopressor to coreg 6.25 PO BID  -Uptitrate Hydral to 25 PO TID  -c/w Imdur 30 PO daily  -Plan to transition Hydral/Imdur to ARB/ARNI post surgery; will also start MRA/SGLT2i at that time  -c/w Lasix 20 PO daily  -Will continue to follow    David Bocanegra MD

## 2023-09-17 NOTE — CONSULT NOTE ADULT - ASSESSMENT
54 y/o male, smoker (5 cigarettes per day x 15 years), with no reported PMH or PSH who presented to [facility] with 3 weeks of TREADWELL, found to have new systolic heart failure with obstructive LAD on cardiac catheterization, transferred to [facility] for MIDCAB evaluation. HF consulted for systolic heart failure.     Review of studies:  [facility] ([facility], [facility]): obstructive LAD, no residual disease   EKG: NSR with biatrial enlargement and LVH  TTE (9/16/23): Dilated LV (6.5cm) with EF 15-20%, dilated RV with reduced function, biatrial enlargement, mild/mod MR, PASP 60, RAP 15    #Acute systolic heart failure  Etiology: ischemic, pending MIDCAB evaluation by CTS  GDMT: would uptitrate hydralazine from 10mg TID to 25mg TID and continue imdur 30mg QD, switch metoprolol to coreg 6.25mg BID; will introduce ARNI/MRA/SGLT2i after surgery   Diuretics: continue lasix 20mg PO QD  Device: premature  AT: N/A    #CAD  Patient with obstructive LAD (no residual disease) on LHC at [facility]. Chest pain free.   - ASA/Statin  - Undergoing MIDCAB evaluation with CTS    Case d/w attending  Bhavin Shine Cardiology Fellow 
Patient is a 54 yo man admitted for chest pain, evaluation for CABG. Newly diagnosed type 2 diabetes

## 2023-09-18 LAB
ANION GAP SERPL CALC-SCNC: 9 MMOL/L — SIGNIFICANT CHANGE UP (ref 5–17)
APPEARANCE UR: CLEAR — SIGNIFICANT CHANGE UP
BILIRUB UR-MCNC: NEGATIVE — SIGNIFICANT CHANGE UP
BUN SERPL-MCNC: 19 MG/DL — SIGNIFICANT CHANGE UP (ref 7–23)
CALCIUM SERPL-MCNC: 7.6 MG/DL — LOW (ref 8.4–10.5)
CHLORIDE SERPL-SCNC: 97 MMOL/L — SIGNIFICANT CHANGE UP (ref 96–108)
CO2 SERPL-SCNC: 26 MMOL/L — SIGNIFICANT CHANGE UP (ref 22–31)
COLOR SPEC: YELLOW — SIGNIFICANT CHANGE UP
CREAT SERPL-MCNC: 0.69 MG/DL — SIGNIFICANT CHANGE UP (ref 0.5–1.3)
DIFF PNL FLD: NEGATIVE — SIGNIFICANT CHANGE UP
EGFR: 111 ML/MIN/1.73M2 — SIGNIFICANT CHANGE UP
GLUCOSE BLDC GLUCOMTR-MCNC: 143 MG/DL — HIGH (ref 70–99)
GLUCOSE BLDC GLUCOMTR-MCNC: 165 MG/DL — HIGH (ref 70–99)
GLUCOSE BLDC GLUCOMTR-MCNC: 171 MG/DL — HIGH (ref 70–99)
GLUCOSE BLDC GLUCOMTR-MCNC: 229 MG/DL — HIGH (ref 70–99)
GLUCOSE SERPL-MCNC: 226 MG/DL — HIGH (ref 70–99)
GLUCOSE UR QL: NEGATIVE — SIGNIFICANT CHANGE UP
HCT VFR BLD CALC: 36.9 % — LOW (ref 39–50)
HGB BLD-MCNC: 12 G/DL — LOW (ref 13–17)
KETONES UR-MCNC: NEGATIVE — SIGNIFICANT CHANGE UP
LEUKOCYTE ESTERASE UR-ACNC: NEGATIVE — SIGNIFICANT CHANGE UP
MAGNESIUM SERPL-MCNC: 2.1 MG/DL — SIGNIFICANT CHANGE UP (ref 1.6–2.6)
MCHC RBC-ENTMCNC: 29.9 PG — SIGNIFICANT CHANGE UP (ref 27–34)
MCHC RBC-ENTMCNC: 32.5 GM/DL — SIGNIFICANT CHANGE UP (ref 32–36)
MCV RBC AUTO: 92 FL — SIGNIFICANT CHANGE UP (ref 80–100)
NITRITE UR-MCNC: NEGATIVE — SIGNIFICANT CHANGE UP
NRBC # BLD: 0 /100 WBCS — SIGNIFICANT CHANGE UP (ref 0–0)
PH UR: 6 — SIGNIFICANT CHANGE UP (ref 5–8)
PLATELET # BLD AUTO: 180 K/UL — SIGNIFICANT CHANGE UP (ref 150–400)
POTASSIUM SERPL-MCNC: 4 MMOL/L — SIGNIFICANT CHANGE UP (ref 3.5–5.3)
POTASSIUM SERPL-SCNC: 4 MMOL/L — SIGNIFICANT CHANGE UP (ref 3.5–5.3)
PROT UR-MCNC: NEGATIVE MG/DL — SIGNIFICANT CHANGE UP
RBC # BLD: 4.01 M/UL — LOW (ref 4.2–5.8)
RBC # FLD: 12.8 % — SIGNIFICANT CHANGE UP (ref 10.3–14.5)
SODIUM SERPL-SCNC: 132 MMOL/L — LOW (ref 135–145)
SP GR SPEC: 1.01 — SIGNIFICANT CHANGE UP (ref 1–1.03)
UROBILINOGEN FLD QL: 1 E.U./DL — SIGNIFICANT CHANGE UP
WBC # BLD: 5.97 K/UL — SIGNIFICANT CHANGE UP (ref 3.8–10.5)
WBC # FLD AUTO: 5.97 K/UL — SIGNIFICANT CHANGE UP (ref 3.8–10.5)

## 2023-09-18 PROCEDURE — 94010 BREATHING CAPACITY TEST: CPT | Mod: 26

## 2023-09-18 PROCEDURE — 99232 SBSQ HOSP IP/OBS MODERATE 35: CPT

## 2023-09-18 PROCEDURE — 99233 SBSQ HOSP IP/OBS HIGH 50: CPT | Mod: GC

## 2023-09-18 PROCEDURE — 71045 X-RAY EXAM CHEST 1 VIEW: CPT | Mod: 26

## 2023-09-18 PROCEDURE — 75561 CARDIAC MRI FOR MORPH W/DYE: CPT | Mod: 26

## 2023-09-18 RX ORDER — INSULIN LISPRO 100/ML
8 VIAL (ML) SUBCUTANEOUS
Refills: 0 | Status: DISCONTINUED | OUTPATIENT
Start: 2023-09-18 | End: 2023-09-19

## 2023-09-18 RX ORDER — DIPHENHYDRAMINE HCL 50 MG
25 CAPSULE ORAL ONCE
Refills: 0 | Status: COMPLETED | OUTPATIENT
Start: 2023-09-18 | End: 2023-09-18

## 2023-09-18 RX ORDER — ISOPROPYL ALCOHOL, BENZOCAINE .7; .06 ML/ML; ML/ML
0 SWAB TOPICAL
Qty: 100 | Refills: 1
Start: 2023-09-18

## 2023-09-18 RX ORDER — METOCLOPRAMIDE HCL 10 MG
10 TABLET ORAL ONCE
Refills: 0 | Status: COMPLETED | OUTPATIENT
Start: 2023-09-18 | End: 2023-09-18

## 2023-09-18 RX ADMIN — Medication 25 MILLIGRAM(S): at 22:47

## 2023-09-18 RX ADMIN — Medication 25 MILLIGRAM(S): at 18:37

## 2023-09-18 RX ADMIN — Medication 5 UNIT(S): at 11:18

## 2023-09-18 RX ADMIN — Medication 25 MILLIGRAM(S): at 13:23

## 2023-09-18 RX ADMIN — CARVEDILOL PHOSPHATE 6.25 MILLIGRAM(S): 80 CAPSULE, EXTENDED RELEASE ORAL at 06:15

## 2023-09-18 RX ADMIN — HEPARIN SODIUM 5000 UNIT(S): 5000 INJECTION INTRAVENOUS; SUBCUTANEOUS at 05:20

## 2023-09-18 RX ADMIN — INSULIN GLARGINE 15 UNIT(S): 100 INJECTION, SOLUTION SUBCUTANEOUS at 22:47

## 2023-09-18 RX ADMIN — Medication 4: at 06:33

## 2023-09-18 RX ADMIN — HEPARIN SODIUM 5000 UNIT(S): 5000 INJECTION INTRAVENOUS; SUBCUTANEOUS at 22:46

## 2023-09-18 RX ADMIN — PANTOPRAZOLE SODIUM 40 MILLIGRAM(S): 20 TABLET, DELAYED RELEASE ORAL at 06:15

## 2023-09-18 RX ADMIN — Medication 1000 MILLIGRAM(S): at 14:15

## 2023-09-18 RX ADMIN — SODIUM CHLORIDE 3 MILLILITER(S): 9 INJECTION INTRAMUSCULAR; INTRAVENOUS; SUBCUTANEOUS at 05:26

## 2023-09-18 RX ADMIN — Medication 81 MILLIGRAM(S): at 11:19

## 2023-09-18 RX ADMIN — Medication 1000 MILLIGRAM(S): at 13:43

## 2023-09-18 RX ADMIN — CARVEDILOL PHOSPHATE 6.25 MILLIGRAM(S): 80 CAPSULE, EXTENDED RELEASE ORAL at 17:08

## 2023-09-18 RX ADMIN — Medication 10 MILLIGRAM(S): at 18:37

## 2023-09-18 RX ADMIN — SODIUM CHLORIDE 3 MILLILITER(S): 9 INJECTION INTRAMUSCULAR; INTRAVENOUS; SUBCUTANEOUS at 14:00

## 2023-09-18 RX ADMIN — Medication 20 MILLIGRAM(S): at 05:20

## 2023-09-18 RX ADMIN — Medication 2: at 11:18

## 2023-09-18 RX ADMIN — Medication 25 MILLIGRAM(S): at 05:20

## 2023-09-18 RX ADMIN — ISOSORBIDE MONONITRATE 30 MILLIGRAM(S): 60 TABLET, EXTENDED RELEASE ORAL at 11:19

## 2023-09-18 RX ADMIN — SODIUM CHLORIDE 3 MILLILITER(S): 9 INJECTION INTRAMUSCULAR; INTRAVENOUS; SUBCUTANEOUS at 22:39

## 2023-09-18 RX ADMIN — Medication 5 UNIT(S): at 06:33

## 2023-09-18 RX ADMIN — HEPARIN SODIUM 5000 UNIT(S): 5000 INJECTION INTRAVENOUS; SUBCUTANEOUS at 13:23

## 2023-09-18 RX ADMIN — ATORVASTATIN CALCIUM 80 MILLIGRAM(S): 80 TABLET, FILM COATED ORAL at 22:46

## 2023-09-18 NOTE — PROGRESS NOTE ADULT - SUBJECTIVE AND OBJECTIVE BOX
Patient discussed on morning rounds with Dr. Naylor     SUBJECTIVE ASSESSMENT:  53y Male assessed at bedside this AM. Endorses intermittent SOB. Denies CP/n/v/fever/chills.         Vital Signs Last 24 Hrs  T(C): 36.9 (18 Sep 2023 08:42), Max: 36.9 (18 Sep 2023 08:42)  T(F): 98.4 (18 Sep 2023 08:42), Max: 98.4 (18 Sep 2023 08:42)  HR: 84 (18 Sep 2023 08:16) (78 - 100)  BP: 143/93 (18 Sep 2023 08:16) (107/61 - 164/97)  BP(mean): 113 (18 Sep 2023 08:16) (80 - 127)  RR: 18 (18 Sep 2023 08:16) (18 - 18)  SpO2: 95% (18 Sep 2023 08:16) (95% - 96%)    Parameters below as of 18 Sep 2023 08:16  Patient On (Oxygen Delivery Method): room air      I&O's Detail    17 Sep 2023 07:01  -  18 Sep 2023 07:00  --------------------------------------------------------  IN:  Total IN: 0 mL    OUT:    Voided (mL): 350 mL  Total OUT: 350 mL    Total NET: -350 mL          PHYSICAL EXAM:  Appearance: No acute distress.  Neurologic: AAOx3, no AMS or focal deficits.  Responds appropriately to verbal and physical stimuli; exhibits purposeful movement in all extremities.  HEENT:   MMM, PERRLA, EOMI	b/l  Neck: Supple  Cardiovascular: RRR, S1 S2. No m/r/g.  Respiratory: No acute respiratory distress. CTA b/l, no w/r/r.   Gastrointestinal:  Soft, non-tender, non-distended, + BS.	  Skin: No rashes. No ecchymoses. No cyanosis.  Extremities: Exhibits normal range of motion, no clubbing, cyanosis or edema.  Vascular: Peripheral pulses palpable 2+ bilaterally.        LABS:                        12.0   5.97  )-----------( 180      ( 18 Sep 2023 06:56 )             36.9       COUMADIN:  No        09-18    132<L>  |  97  |  19  ----------------------------<  226<H>  4.0   |  26  |  0.69    Ca    7.6<L>      18 Sep 2023 06:56  Mg     2.1     09-18        Urinalysis Basic - ( 18 Sep 2023 06:56 )    Color: x / Appearance: x / SG: x / pH: x  Gluc: 226 mg/dL / Ketone: x  / Bili: x / Urobili: x   Blood: x / Protein: x / Nitrite: x   Leuk Esterase: x / RBC: x / WBC x   Sq Epi: x / Non Sq Epi: x / Bacteria: x        MEDICATIONS  (STANDING):  aspirin enteric coated 81 milliGRAM(s) Oral daily  atorvastatin 80 milliGRAM(s) Oral at bedtime  carvedilol 6.25 milliGRAM(s) Oral every 12 hours  dextrose 5%. 1000 milliLiter(s) (50 mL/Hr) IV Continuous <Continuous>  dextrose 5%. 1000 milliLiter(s) (100 mL/Hr) IV Continuous <Continuous>  dextrose 50% Injectable 25 Gram(s) IV Push once  dextrose 50% Injectable 25 Gram(s) IV Push once  dextrose 50% Injectable 12.5 Gram(s) IV Push once  furosemide    Tablet 20 milliGRAM(s) Oral daily  glucagon  Injectable 1 milliGRAM(s) IntraMuscular once  glucagon  Injectable 1 milliGRAM(s) IntraMuscular once  heparin   Injectable 5000 Unit(s) SubCutaneous every 8 hours  hydrALAZINE 25 milliGRAM(s) Oral every 8 hours  influenza   Vaccine 0.5 milliLiter(s) IntraMuscular once  insulin glargine Injectable (LANTUS) 15 Unit(s) SubCutaneous at bedtime  insulin lispro (ADMELOG) corrective regimen sliding scale   SubCutaneous Before meals and at bedtime  insulin lispro Injectable (ADMELOG) 5 Unit(s) SubCutaneous three times a day before meals  isosorbide   mononitrate ER Tablet (IMDUR) 30 milliGRAM(s) Oral daily  pantoprazole    Tablet 40 milliGRAM(s) Oral before breakfast  sodium chloride 0.9% lock flush 3 milliLiter(s) IV Push every 8 hours    MEDICATIONS  (PRN):  acetaminophen     Tablet .. 1000 milliGRAM(s) Oral every 6 hours PRN Temp greater or equal to 38C (100.4F), Mild Pain (1 - 3)  albuterol/ipratropium for Nebulization 3 milliLiter(s) Nebulizer every 6 hours PRN Shortness of Breath and/or Wheezing  dextrose Oral Gel 15 Gram(s) Oral once PRN Blood Glucose LESS THAN 70 milliGRAM(s)/deciliter  zolpidem 5 milliGRAM(s) Oral at bedtime PRN Insomnia        RADIOLOGY & ADDITIONAL TESTS:  < from: Xray Chest 1 View- PORTABLE-Routine (Xray Chest 1 View- PORTABLE-Routine in AM.) (09.18.23 @ 05:21) >  Findings/  impression: Stable cardiomegaly, thoracic aortic calcification.. Right   pleural effusion, decreased. Stable bony structures.    < end of copied text >  < from: TTE Echo Complete w/ Contrast w/ Doppler (09.16.23 @ 08:21) >  CONCLUSIONS:     1. Moderately dilated left ventricular size.   2. Severely reduced left ventricular systolic function, LVEF 10-15%.   3. Grade III left ventricular diastolic dysfunction.   4. Mildly dilated right ventricular size.   5. Moderately reduced right ventricular systolic function.   6. Biatrial enlargement.   7. Mild-to-moderate mitral regurgitation.   8. Pulmonary hypertension present, pulmonary artery systolic pressure is   60 mmHg.   9. Trivial pericardial effusion.  10. No prior echo is available for comparison.    < end of copied text >

## 2023-09-18 NOTE — PROGRESS NOTE ADULT - SUBJECTIVE AND OBJECTIVE BOX
SUBJECTIVE / INTERVAL HPI: Patient was seen and examined this morning.     CAPILLARY BLOOD GLUCOSE & INSULIN RECEIVED  132 mg/dL (09-17 @ 06:53)  310 mg/dL (09-17 @ 11:20)  220 mg/dL (09-17 @ 15:42)  205 mg/dL (09-17 @ 17:03)  108 mg/dL (09-17 @ 21:26)  229 mg/dL (09-18 @ 06:23)  226 mg/dL (09-18 @ 06:56)  171 mg/dL (09-18 @ 11:05)      REVIEW OF SYSTEMS  Constitutional:  Negative fever, chills or loss of appetite.  Eyes:  Negative blurry vision or double vision.  Cardiovascular:  Negative for chest pain or palpitations.  Respiratory:  Negative for cough, wheezing, or shortness of breath.    Gastrointestinal:  Negative for nausea, vomiting, diarrhea, constipation, or abdominal pain.  Genitourinary:  Negative frequency, urgency or dysuria.  Neurologic:  No headache, confusion, dizziness, lightheadedness.    PHYSICAL EXAM  Vital Signs Last 24 Hrs  T(C): 36.6 (18 Sep 2023 13:58), Max: 36.9 (18 Sep 2023 08:42)  T(F): 97.8 (18 Sep 2023 13:58), Max: 98.4 (18 Sep 2023 08:42)  HR: 84 (18 Sep 2023 08:16) (78 - 100)  BP: 143/93 (18 Sep 2023 08:16) (107/61 - 163/105)  BP(mean): 113 (18 Sep 2023 08:16) (80 - 127)  RR: 18 (18 Sep 2023 08:16) (18 - 18)  SpO2: 95% (18 Sep 2023 08:16) (95% - 96%)    Parameters below as of 18 Sep 2023 08:16  Patient On (Oxygen Delivery Method): room air        Constitutional: Awake, alert, in no acute distress.   HEENT: Normocephalic, atraumatic, EDWARD.  Respiratory: Lungs clear to ausculation bilaterally.   Cardiovascular: regular rhythm, normal S1 and S2, no audible murmurs.   GI: soft, non-tender, non-distended, bowel sounds present.  Extremities: No lower extremity edema.  Psychiatric: AAO x 3. Normal affect/mood.     LABS  CBC - WBC/HGB/HTC/PLT: 5.97/12.0/36.9/180 (09-18-23)  BMP - Na/K/Cl/Bicarb/BUN/Cr/Gluc/AG/eGFR: 132/4.0/97/26/19/0.69/226/9/111 (09-18-23)  Ca - 7.6 (09-18-23)  Phos - -- (09-18-23)  Mg - 2.1 (09-18-23)  LFT - Alb/Tprot/Tbili/Dbili/AlkPhos/ALT/AST: 3.3/--/0.8/--/100/24/29 (09-16-23)  PT/aPTT/INR: 14.1/34.2/1.24 (09-15-23)   Thyroid Stimulating Hormone, Serum: 0.723 (09-15-23)      MEDICATIONS  MEDICATIONS  (STANDING):  aspirin enteric coated 81 milliGRAM(s) Oral daily  atorvastatin 80 milliGRAM(s) Oral at bedtime  carvedilol 6.25 milliGRAM(s) Oral every 12 hours  dextrose 5%. 1000 milliLiter(s) (50 mL/Hr) IV Continuous <Continuous>  dextrose 5%. 1000 milliLiter(s) (100 mL/Hr) IV Continuous <Continuous>  dextrose 50% Injectable 25 Gram(s) IV Push once  dextrose 50% Injectable 12.5 Gram(s) IV Push once  dextrose 50% Injectable 25 Gram(s) IV Push once  furosemide    Tablet 20 milliGRAM(s) Oral daily  glucagon  Injectable 1 milliGRAM(s) IntraMuscular once  glucagon  Injectable 1 milliGRAM(s) IntraMuscular once  heparin   Injectable 5000 Unit(s) SubCutaneous every 8 hours  hydrALAZINE 25 milliGRAM(s) Oral every 8 hours  influenza   Vaccine 0.5 milliLiter(s) IntraMuscular once  insulin glargine Injectable (LANTUS) 15 Unit(s) SubCutaneous at bedtime  insulin lispro (ADMELOG) corrective regimen sliding scale   SubCutaneous Before meals and at bedtime  insulin lispro Injectable (ADMELOG) 8 Unit(s) SubCutaneous three times a day before meals  isosorbide   mononitrate ER Tablet (IMDUR) 30 milliGRAM(s) Oral daily  pantoprazole    Tablet 40 milliGRAM(s) Oral before breakfast  sodium chloride 0.9% lock flush 3 milliLiter(s) IV Push every 8 hours    MEDICATIONS  (PRN):  acetaminophen     Tablet .. 1000 milliGRAM(s) Oral every 6 hours PRN Temp greater or equal to 38C (100.4F), Mild Pain (1 - 3)  albuterol/ipratropium for Nebulization 3 milliLiter(s) Nebulizer every 6 hours PRN Shortness of Breath and/or Wheezing  dextrose Oral Gel 15 Gram(s) Oral once PRN Blood Glucose LESS THAN 70 milliGRAM(s)/deciliter  zolpidem 5 milliGRAM(s) Oral at bedtime PRN Insomnia    ASSESSMENT / RECOMMENDATIONS    A1C: 8.8 %  BUN: 19  Creatinine: 0.69  GFR: 111  Weight: 76.5  BMI: 24.2  EF:     # Type 2 diabetes mellitus with hyperglycemia  - Please continue lantus *** units at bedtime.   - Continue lispro *** units before each meal.  - Continue lispro moderate / low dose sliding scale before meals and at bedtime.  - Patient's fingerstick glucose goal is 100-180 mg/dL.    - For discharge, patient can ***.    - Patient can follow up at discharge with [facility] by calling [phone] to make an appointment.      Case discussed with Dr. Trevino. Primary team updated.          SUBJECTIVE / INTERVAL HPI: Patient was seen and examined this morning. No new complaints. Pending cardiac MRI. Eating well. Food being brought in from outside, currently drinking regular coke.    CAPILLARY BLOOD GLUCOSE & INSULIN RECEIVED  205 mg/dL (09-17 @ 17:03) - Lispro 5+4. Ate chicken wrap and crackers.  108 mg/dL (09-17 @ 21:26)  229 mg/dL (09-18 @ 06:23) - Lantus 15  226 mg/dL (09-18 @ 06:56) - Lispro 8+4. Ate pancake, greco, and coffee.  171 mg/dL (09-18 @ 11:05)      REVIEW OF SYSTEMS  Constitutional:  Negative fever, chills or loss of appetite.  Eyes:  Negative blurry vision or double vision.  Cardiovascular:  Negative for chest pain or palpitations.  Respiratory:  Negative for cough, wheezing, or shortness of breath.    Gastrointestinal:  Negative for nausea, vomiting, diarrhea, constipation, or abdominal pain.  Genitourinary:  Negative frequency, urgency or dysuria.  Neurologic:  No headache, confusion, dizziness, lightheadedness.    PHYSICAL EXAM  Vital Signs Last 24 Hrs  T(C): 36.6 (18 Sep 2023 13:58), Max: 36.9 (18 Sep 2023 08:42)  T(F): 97.8 (18 Sep 2023 13:58), Max: 98.4 (18 Sep 2023 08:42)  HR: 84 (18 Sep 2023 08:16) (78 - 100)  BP: 143/93 (18 Sep 2023 08:16) (107/61 - 163/105)  BP(mean): 113 (18 Sep 2023 08:16) (80 - 127)  RR: 18 (18 Sep 2023 08:16) (18 - 18)  SpO2: 95% (18 Sep 2023 08:16) (95% - 96%)    Parameters below as of 18 Sep 2023 08:16  Patient On (Oxygen Delivery Method): room air    Constitutional: Awake, alert, in no acute distress.   HEENT: Normocephalic, atraumatic, EDWARD.  Respiratory: Lungs clear to ausculation bilaterally.   Cardiovascular: regular rhythm, normal S1 and S2, no audible murmurs.   GI: soft, non-tender, non-distended, bowel sounds present.  Extremities: No lower extremity edema.  Psychiatric: AAO x 3. Normal affect/mood.     LABS  CBC - WBC/HGB/HTC/PLT: 5.97/12.0/36.9/180 (09-18-23)  BMP - Na/K/Cl/Bicarb/BUN/Cr/Gluc/AG/eGFR: 132/4.0/97/26/19/0.69/226/9/111 (09-18-23)  Ca - 7.6 (09-18-23)  Phos - -- (09-18-23)  Mg - 2.1 (09-18-23)  LFT - Alb/Tprot/Tbili/Dbili/AlkPhos/ALT/AST: 3.3/--/0.8/--/100/24/29 (09-16-23)  PT/aPTT/INR: 14.1/34.2/1.24 (09-15-23)   Thyroid Stimulating Hormone, Serum: 0.723 (09-15-23)      MEDICATIONS  MEDICATIONS  (STANDING):  aspirin enteric coated 81 milliGRAM(s) Oral daily  atorvastatin 80 milliGRAM(s) Oral at bedtime  carvedilol 6.25 milliGRAM(s) Oral every 12 hours  dextrose 5%. 1000 milliLiter(s) (50 mL/Hr) IV Continuous <Continuous>  dextrose 5%. 1000 milliLiter(s) (100 mL/Hr) IV Continuous <Continuous>  dextrose 50% Injectable 25 Gram(s) IV Push once  dextrose 50% Injectable 12.5 Gram(s) IV Push once  dextrose 50% Injectable 25 Gram(s) IV Push once  furosemide    Tablet 20 milliGRAM(s) Oral daily  glucagon  Injectable 1 milliGRAM(s) IntraMuscular once  glucagon  Injectable 1 milliGRAM(s) IntraMuscular once  heparin   Injectable 5000 Unit(s) SubCutaneous every 8 hours  hydrALAZINE 25 milliGRAM(s) Oral every 8 hours  influenza   Vaccine 0.5 milliLiter(s) IntraMuscular once  insulin glargine Injectable (LANTUS) 15 Unit(s) SubCutaneous at bedtime  insulin lispro (ADMELOG) corrective regimen sliding scale   SubCutaneous Before meals and at bedtime  insulin lispro Injectable (ADMELOG) 8 Unit(s) SubCutaneous three times a day before meals  isosorbide   mononitrate ER Tablet (IMDUR) 30 milliGRAM(s) Oral daily  pantoprazole    Tablet 40 milliGRAM(s) Oral before breakfast  sodium chloride 0.9% lock flush 3 milliLiter(s) IV Push every 8 hours    MEDICATIONS  (PRN):  acetaminophen     Tablet .. 1000 milliGRAM(s) Oral every 6 hours PRN Temp greater or equal to 38C (100.4F), Mild Pain (1 - 3)  albuterol/ipratropium for Nebulization 3 milliLiter(s) Nebulizer every 6 hours PRN Shortness of Breath and/or Wheezing  dextrose Oral Gel 15 Gram(s) Oral once PRN Blood Glucose LESS THAN 70 milliGRAM(s)/deciliter  zolpidem 5 milliGRAM(s) Oral at bedtime PRN Insomnia

## 2023-09-18 NOTE — PROGRESS NOTE ADULT - SUBJECTIVE AND OBJECTIVE BOX
Heart Failure Consult    Interval History/HPI: Pt seen and examined at bedside. Feeling better. SOB has resolved. Denies chest pain or palpitations.   Does not follow with a PCP. Has not been diagnosed with HF in the past. Denies alcohol use.     Telemetry:    OBJECTIVE  T(C): 36.6 (09-18-23 @ 13:58), Max: 36.9 (09-18-23 @ 08:42)  HR: 84 (09-18-23 @ 08:16) (78 - 100)  BP: 143/93 (09-18-23 @ 08:16) (107/61 - 163/105)  RR: 18 (09-18-23 @ 08:16) (18 - 18)  SpO2: 95% (09-18-23 @ 08:16) (95% - 96%)    09-17-23 @ 07:01  -  09-18-23 @ 07:00  --------------------------------------------------------  IN: 0 mL / OUT: 350 mL / NET: -350 mL        PHYSICAL EXAM:  GEN: Awake, comfortable. NAD.   HEENT: MMM. No JVD.   RESP: CTA b/l  CV: RRR, normal s1/s2. No m/r/g.  ABD: Soft, NTND. BS+  EXT: Warm. No edema  NEURO: AAOx3. No focal deficits.      LABS:                        12.0   5.97  )-----------( 180      ( 18 Sep 2023 06:56 )             36.9     09-18    132<L>  |  97  |  19  ----------------------------<  226<H>  4.0   |  26  |  0.69    Ca    7.6<L>      18 Sep 2023 06:56  Mg     2.1     09-18        Urinalysis Basic - ( 18 Sep 2023 06:56 )    Color: x / Appearance: x / SG: x / pH: x  Gluc: 226 mg/dL / Ketone: x  / Bili: x / Urobili: x   Blood: x / Protein: x / Nitrite: x   Leuk Esterase: x / RBC: x / WBC x   Sq Epi: x / Non Sq Epi: x / Bacteria: x        RADIOLOGY & ADDITIONAL TESTS:  Reviewed .    MEDICATIONS  (STANDING):  aspirin enteric coated 81 milliGRAM(s) Oral daily  atorvastatin 80 milliGRAM(s) Oral at bedtime  carvedilol 6.25 milliGRAM(s) Oral every 12 hours  dextrose 5%. 1000 milliLiter(s) (50 mL/Hr) IV Continuous <Continuous>  dextrose 5%. 1000 milliLiter(s) (100 mL/Hr) IV Continuous <Continuous>  dextrose 50% Injectable 25 Gram(s) IV Push once  dextrose 50% Injectable 12.5 Gram(s) IV Push once  dextrose 50% Injectable 25 Gram(s) IV Push once  furosemide    Tablet 20 milliGRAM(s) Oral daily  glucagon  Injectable 1 milliGRAM(s) IntraMuscular once  glucagon  Injectable 1 milliGRAM(s) IntraMuscular once  heparin   Injectable 5000 Unit(s) SubCutaneous every 8 hours  hydrALAZINE 25 milliGRAM(s) Oral every 8 hours  influenza   Vaccine 0.5 milliLiter(s) IntraMuscular once  insulin glargine Injectable (LANTUS) 15 Unit(s) SubCutaneous at bedtime  insulin lispro (ADMELOG) corrective regimen sliding scale   SubCutaneous Before meals and at bedtime  insulin lispro Injectable (ADMELOG) 5 Unit(s) SubCutaneous three times a day before meals  isosorbide   mononitrate ER Tablet (IMDUR) 30 milliGRAM(s) Oral daily  pantoprazole    Tablet 40 milliGRAM(s) Oral before breakfast  sodium chloride 0.9% lock flush 3 milliLiter(s) IV Push every 8 hours    MEDICATIONS  (PRN):  acetaminophen     Tablet .. 1000 milliGRAM(s) Oral every 6 hours PRN Temp greater or equal to 38C (100.4F), Mild Pain (1 - 3)  albuterol/ipratropium for Nebulization 3 milliLiter(s) Nebulizer every 6 hours PRN Shortness of Breath and/or Wheezing  dextrose Oral Gel 15 Gram(s) Oral once PRN Blood Glucose LESS THAN 70 milliGRAM(s)/deciliter  zolpidem 5 milliGRAM(s) Oral at bedtime PRN Insomnia     Heart Failure Consult    Interval History/HPI: Pt seen and examined at bedside. Feeling better. SOB has resolved. Denies chest pain or palpitations.   Does not follow with a PCP. Has not been diagnosed with HF in the past. Denies alcohol use.       OBJECTIVE  T(C): 36.6 (09-18-23 @ 13:58), Max: 36.9 (09-18-23 @ 08:42)  HR: 84 (09-18-23 @ 08:16) (78 - 100)  BP: 143/93 (09-18-23 @ 08:16) (107/61 - 163/105)  RR: 18 (09-18-23 @ 08:16) (18 - 18)  SpO2: 95% (09-18-23 @ 08:16) (95% - 96%)    09-17-23 @ 07:01  -  09-18-23 @ 07:00  --------------------------------------------------------  IN: 0 mL / OUT: 350 mL / NET: -350 mL        PHYSICAL EXAM:  GEN: Awake, comfortable. NAD.   HEENT: MMM. No JVD.   RESP: CTA b/l  CV: RRR, normal s1/s2. No m/r/g.  ABD: Soft, NTND. BS+  EXT: Warm. No edema  NEURO: AAOx3. No focal deficits.      LABS:                        12.0   5.97  )-----------( 180      ( 18 Sep 2023 06:56 )             36.9     09-18    132<L>  |  97  |  19  ----------------------------<  226<H>  4.0   |  26  |  0.69    Ca    7.6<L>      18 Sep 2023 06:56  Mg     2.1     09-18        Urinalysis Basic - ( 18 Sep 2023 06:56 )    Color: x / Appearance: x / SG: x / pH: x  Gluc: 226 mg/dL / Ketone: x  / Bili: x / Urobili: x   Blood: x / Protein: x / Nitrite: x   Leuk Esterase: x / RBC: x / WBC x   Sq Epi: x / Non Sq Epi: x / Bacteria: x        RADIOLOGY & ADDITIONAL TESTS:  Reviewed .    MEDICATIONS  (STANDING):  aspirin enteric coated 81 milliGRAM(s) Oral daily  atorvastatin 80 milliGRAM(s) Oral at bedtime  carvedilol 6.25 milliGRAM(s) Oral every 12 hours  dextrose 5%. 1000 milliLiter(s) (50 mL/Hr) IV Continuous <Continuous>  dextrose 5%. 1000 milliLiter(s) (100 mL/Hr) IV Continuous <Continuous>  dextrose 50% Injectable 25 Gram(s) IV Push once  dextrose 50% Injectable 12.5 Gram(s) IV Push once  dextrose 50% Injectable 25 Gram(s) IV Push once  furosemide    Tablet 20 milliGRAM(s) Oral daily  glucagon  Injectable 1 milliGRAM(s) IntraMuscular once  glucagon  Injectable 1 milliGRAM(s) IntraMuscular once  heparin   Injectable 5000 Unit(s) SubCutaneous every 8 hours  hydrALAZINE 25 milliGRAM(s) Oral every 8 hours  influenza   Vaccine 0.5 milliLiter(s) IntraMuscular once  insulin glargine Injectable (LANTUS) 15 Unit(s) SubCutaneous at bedtime  insulin lispro (ADMELOG) corrective regimen sliding scale   SubCutaneous Before meals and at bedtime  insulin lispro Injectable (ADMELOG) 5 Unit(s) SubCutaneous three times a day before meals  isosorbide   mononitrate ER Tablet (IMDUR) 30 milliGRAM(s) Oral daily  pantoprazole    Tablet 40 milliGRAM(s) Oral before breakfast  sodium chloride 0.9% lock flush 3 milliLiter(s) IV Push every 8 hours    MEDICATIONS  (PRN):  acetaminophen     Tablet .. 1000 milliGRAM(s) Oral every 6 hours PRN Temp greater or equal to 38C (100.4F), Mild Pain (1 - 3)  albuterol/ipratropium for Nebulization 3 milliLiter(s) Nebulizer every 6 hours PRN Shortness of Breath and/or Wheezing  dextrose Oral Gel 15 Gram(s) Oral once PRN Blood Glucose LESS THAN 70 milliGRAM(s)/deciliter  zolpidem 5 milliGRAM(s) Oral at bedtime PRN Insomnia

## 2023-09-18 NOTE — PROGRESS NOTE ADULT - ATTENDING COMMENTS
54 YO M with a history of active tobacco use and newly diagnosed DM2 (A1c 8.8%) who presented to [place] with dyspnea and found to have volume overload with severe LV dysfunction. LHC performed revealing obstructive LAD disease prompting transfer to [facility] for CABG (MIDCAB) evaluation.    His severe LV dilatation and global hypokinesis suggests a chronic component and a likely superimposed non-ischemic component such as hypertensive heart disease. Since his presentation was for HF and not ACS and lack of high risk disease on LHC, it is reasonable to medically manage his HF with GDMT for ~3 months to achieve some remodeling prior to surgery and will discuss this with Dr. Naylor     REVIEW OF STUDIES  TTE: LV 6.5 cm, LVEF 15% with global hypokinesis, moderate RV dysfunction, LVOT VTI 12 cm, mild-moderate MR, dilated IVC, PASP 60 mmHg   LHC: ~80% eccentric mid LAD disease just past first diagnoal, mild RCA disease, mild-moderate LCx disease   EKG: pending     PLAN  # Acute systolic heart failure  -Etiology: ischemic but dilatation and global hypokinesis suggest possible NICM component as well, possibly hypertensive heart disease. obtain cMRI   -GDMT: currently on carvedilol 6.25 mg BID, hydralizine 25 mg TID, and imdur 30 mg daily. will discuss possible delayed surgery with Dr. Naylor, if reasonable will switch hydral/imdur to entresto 49-51 mg BID and start MRA/SGLT2i before discharge  -Diuretics: continue lasix 20 mg PO daily   -Device: premature    # Coronary artery disease  - on ASA/statin  - as above, will discuss outpatient GDMT for pre-op optimization. otherwise will need pre-op RHC and +/- MCS post-operatively   - cMRI pending     # DM2  - Diagnosed this admission with A1c 8.8%, Endocrine consulted   - SGLT2i on discharge

## 2023-09-18 NOTE — PROGRESS NOTE ADULT - ASSESSMENT
Patient is a 54 yo man admitted for chest pain, evaluation for CABG. Newly diagnosed type 2 diabetes  A1C: 8.8 %  BUN: 19  Creatinine: 0.69  GFR: 111  Weight: 76.5  BMI: 24.2  EF 10-15%

## 2023-09-18 NOTE — PROGRESS NOTE ADULT - ASSESSMENT
52 y/o male, smoker (5 cigarettes per day x 15 years), with no reported PMH or PSH who presented to [facility] with 3 weeks of TREADWELL, found to have new systolic heart failure with obstructive LAD on cardiac catheterization, transferred to [facility] for MIDCAB evaluation. HF consulted for systolic heart failure.     Review of studies:  [facility] ([facility], [facility]): obstructive LAD, no residual disease   EKG: NSR with biatrial enlargement and LVH  TTE (9/16/23): Dilated LV (6.5cm) with EF 15-20%, dilated RV with reduced function, biatrial enlargement, mild/mod MR, PASP 60, RAP 15    #Acute systolic heart failure  Etiology: Suspect ischemic, however, TTE with global hypokinesis, LV dilation, and without specific WMA suggests nonischemic component. Agree with cMRI for further assessment of etiology.  GDMT: May be beneficial to optimize GDMT and improve LV function prior to surgery. Will discuss with Dr. Naylor. If plan is to delay surgery, would dc hydral/imdur and start Entresto 49/51mg. For now, continue coreg 6.25mg BID, hydralazine 25mg TID, and imdur 30mg qd.   Diuretics: continue lasix 20mg PO QD  Device: premature  AT: N/A    #CAD  Patient with obstructive LAD (no residual disease) on LHC at [facility]. Chest pain free.   - ASA/Statin  - Undergoing MIDCAB evaluation with CTS  - f/u cMRI    #DM uncontrolled (A1c 8.8)  - Endocrine following, appreciate recommendations, would start SGLT2i on dc  52 y/o male, smoker (5 cigarettes per day x 15 years), with no reported PMH or PSH who presented to [facility] with 3 weeks of TREADWELL, found to have new systolic heart failure with obstructive LAD on cardiac catheterization, transferred to [facility] for MIDCAB evaluation. HF consulted for systolic heart failure.     Review of studies:  [facility] ([facility]): obstructive LAD, no residual disease   EKG: NSR with biatrial enlargement and LVH  TTE (9/16/23): Dilated LV (6.5cm) with EF 15-20%, dilated RV with reduced function, biatrial enlargement, mild/mod MR, PASP 60, RAP 15    #Acute systolic heart failure  Etiology: Suspect ischemic, however, TTE with global hypokinesis, LV dilation, and without specific WMA suggests a nonischemic component. Agree with cMRI for further assessment of etiology.  GDMT: May be beneficial to optimize GDMT and improve LV function prior to surgery. Will discuss with Dr. Naylor. If plan is to delay surgery, would dc hydral/imdur and start Entresto 49/51mg. For now, continue coreg 6.25mg BID, hydralazine 25mg TID, and imdur 30mg qd.   Diuretics: continue lasix 20mg PO QD  Device: premature  AT: N/A    #CAD  Patient with obstructive LAD (no residual disease) on LHC at [facility]. Chest pain free.   - ASA/Statin  - Undergoing MIDCAB evaluation with CTS  - f/u cMRI    #DM uncontrolled (A1c 8.8)  - Endocrine following, appreciate recommendations, would start SGLT2i on dc     Case d/w Dr. Gutierrez   Plan discussed with team    HF will follow along with you.     Amy Duran  Cardiology Fellow

## 2023-09-18 NOTE — PROGRESS NOTE ADULT - ASSESSMENT
52 yo M smoker (5 cigarettes per day x 15 years), with no reported PMH or PSH who presented to [facility] for 3 weeks of progressive shortness of breath, TREADWELL, a cough and bilateral LE edema. At OSH, patient underwent a cardiac catheterization which revealed obstructive LAD disease for which he was referred to Dr. Naylor and transferred to [facility] on 9/15/23 for CABG evaluation. 9/16 BP elevated, up-titrate BB and start hydralazine. Echo w/ LVEF 10-15%. A1c 8.8. Endocrine & HF were consulted. 9/17-9/18 - pt continues GDMT & is planned for cardiac viability MRI.    Plan:    Neurovascular:   -Pain well controlled with current regimen.   -PRN's: tylenol     Cardiovascular:   CAD  - obstructive LAD disease per cath from OSH; undergoing surgical evaluation   - Cardiac viability MRI today   - on ASA, statin, BB, imdur    Acute on chronic systolic HF/ICM   - LVEF 10-15% per echo started  - HF following: Continue hydralazine 25mg TID, imdur 30mg QD, coreg 6.25mg BID    - HTN: Continue hydral, imdur, coreg  - Telemetry monitoring    Respiratory:   Cough, resolved   - COVID/RVP negative at OSH  - PRN duonebs for SOB  -Oxygenating well on room air  -Encourage continued use of IS 10x/hr and frequent ambulation  -CXR: Decreased right effusion     GI:  -GI PPX: protonix   -PO Diet  -Bowel Regimen: senna/miralax     Renal / :  -Continue to monitor renal function: BUN/Cr: 19 & 0.69  -Monitor I/O's daily     Endocrine:    DM II (not on home meds)   -A1c: 8.8%; started Lantus 15, Lispro 5, SSI, CC diet   No hx of thyroid disease   -TSH: 0.723    Hematologic:  -no overt s/s of active bleeding   -CBC: H/H- 12 & 36.9  -Coagulation Panel.    ID:  -Temperature: afebrile   -CBC: WBC- 5.97  -Continue to observe for SIRS/Sepsis Syndrome.    Prophylaxis:  -DVT prophylaxis with 5000 SubQ Heparin q8h.  -Continue with SCD's b/l while patient is at rest     Disposition: Surgical vs medical management; overall plan pending results of cardiac MRI

## 2023-09-19 ENCOUNTER — TRANSCRIPTION ENCOUNTER (OUTPATIENT)
Age: 53
End: 2023-09-19

## 2023-09-19 ENCOUNTER — NON-APPOINTMENT (OUTPATIENT)
Age: 53
End: 2023-09-19

## 2023-09-19 VITALS — TEMPERATURE: 98 F

## 2023-09-19 LAB
GLUCOSE BLDC GLUCOMTR-MCNC: 133 MG/DL — HIGH (ref 70–99)
GLUCOSE BLDC GLUCOMTR-MCNC: 135 MG/DL — HIGH (ref 70–99)
GLUCOSE BLDC GLUCOMTR-MCNC: 206 MG/DL — HIGH (ref 70–99)

## 2023-09-19 PROCEDURE — 99232 SBSQ HOSP IP/OBS MODERATE 35: CPT

## 2023-09-19 PROCEDURE — 99232 SBSQ HOSP IP/OBS MODERATE 35: CPT | Mod: GC

## 2023-09-19 RX ORDER — SPIRONOLACTONE 25 MG/1
25 TABLET, FILM COATED ORAL DAILY
Refills: 0 | Status: DISCONTINUED | OUTPATIENT
Start: 2023-09-19 | End: 2023-09-19

## 2023-09-19 RX ORDER — SPIRONOLACTONE 25 MG/1
1 TABLET, FILM COATED ORAL
Qty: 30 | Refills: 0
Start: 2023-09-19 | End: 2023-10-18

## 2023-09-19 RX ORDER — INSULIN GLARGINE 100 [IU]/ML
10 INJECTION, SOLUTION SUBCUTANEOUS
Qty: 1 | Refills: 0
Start: 2023-09-19 | End: 2023-10-18

## 2023-09-19 RX ORDER — ISOPROPYL ALCOHOL, BENZOCAINE .7; .06 ML/ML; ML/ML
0 SWAB TOPICAL
Qty: 100 | Refills: 1
Start: 2023-09-19

## 2023-09-19 RX ORDER — CARVEDILOL PHOSPHATE 80 MG/1
1 CAPSULE, EXTENDED RELEASE ORAL
Qty: 60 | Refills: 0
Start: 2023-09-19 | End: 2023-10-18

## 2023-09-19 RX ORDER — PANTOPRAZOLE SODIUM 20 MG/1
1 TABLET, DELAYED RELEASE ORAL
Qty: 30 | Refills: 0
Start: 2023-09-19 | End: 2023-10-18

## 2023-09-19 RX ORDER — SACUBITRIL AND VALSARTAN 24; 26 MG/1; MG/1
1 TABLET, FILM COATED ORAL
Qty: 60 | Refills: 0
Start: 2023-09-19 | End: 2023-10-18

## 2023-09-19 RX ORDER — SITAGLIPTIN 50 MG/1
1 TABLET, FILM COATED ORAL
Qty: 30 | Refills: 0
Start: 2023-09-19 | End: 2023-10-18

## 2023-09-19 RX ORDER — ASPIRIN/CALCIUM CARB/MAGNESIUM 324 MG
1 TABLET ORAL
Qty: 30 | Refills: 0
Start: 2023-09-19 | End: 2023-10-18

## 2023-09-19 RX ORDER — POTASSIUM CHLORIDE 20 MEQ
1 PACKET (EA) ORAL
Qty: 30 | Refills: 0
Start: 2023-09-19 | End: 2023-10-18

## 2023-09-19 RX ORDER — GLIMEPIRIDE 1 MG
1 TABLET ORAL
Qty: 30 | Refills: 0
Start: 2023-09-19 | End: 2023-10-18

## 2023-09-19 RX ORDER — DAPAGLIFLOZIN 10 MG/1
1 TABLET, FILM COATED ORAL
Qty: 30 | Refills: 0
Start: 2023-09-19 | End: 2023-10-18

## 2023-09-19 RX ORDER — FUROSEMIDE 40 MG
1 TABLET ORAL
Qty: 30 | Refills: 0
Start: 2023-09-19 | End: 2023-10-18

## 2023-09-19 RX ORDER — LOSARTAN POTASSIUM 100 MG/1
1 TABLET, FILM COATED ORAL
Qty: 30 | Refills: 0
Start: 2023-09-19 | End: 2023-10-18

## 2023-09-19 RX ORDER — EMPAGLIFLOZIN 10 MG/1
1 TABLET, FILM COATED ORAL
Qty: 30 | Refills: 0
Start: 2023-09-19 | End: 2023-10-18

## 2023-09-19 RX ORDER — ATORVASTATIN CALCIUM 80 MG/1
1 TABLET, FILM COATED ORAL
Qty: 30 | Refills: 0
Start: 2023-09-19 | End: 2023-10-18

## 2023-09-19 RX ORDER — LOSARTAN POTASSIUM 100 MG/1
50 TABLET, FILM COATED ORAL DAILY
Refills: 0 | Status: DISCONTINUED | OUTPATIENT
Start: 2023-09-19 | End: 2023-09-19

## 2023-09-19 RX ADMIN — SODIUM CHLORIDE 3 MILLILITER(S): 9 INJECTION INTRAMUSCULAR; INTRAVENOUS; SUBCUTANEOUS at 06:52

## 2023-09-19 RX ADMIN — HEPARIN SODIUM 5000 UNIT(S): 5000 INJECTION INTRAVENOUS; SUBCUTANEOUS at 06:51

## 2023-09-19 RX ADMIN — Medication 4: at 12:06

## 2023-09-19 RX ADMIN — HEPARIN SODIUM 5000 UNIT(S): 5000 INJECTION INTRAVENOUS; SUBCUTANEOUS at 13:21

## 2023-09-19 RX ADMIN — Medication 8 UNIT(S): at 12:06

## 2023-09-19 RX ADMIN — Medication 20 MILLIGRAM(S): at 06:52

## 2023-09-19 RX ADMIN — SPIRONOLACTONE 25 MILLIGRAM(S): 25 TABLET, FILM COATED ORAL at 14:02

## 2023-09-19 RX ADMIN — PANTOPRAZOLE SODIUM 40 MILLIGRAM(S): 20 TABLET, DELAYED RELEASE ORAL at 07:11

## 2023-09-19 RX ADMIN — Medication 81 MILLIGRAM(S): at 12:06

## 2023-09-19 RX ADMIN — ZOLPIDEM TARTRATE 5 MILLIGRAM(S): 10 TABLET ORAL at 01:00

## 2023-09-19 RX ADMIN — SODIUM CHLORIDE 3 MILLILITER(S): 9 INJECTION INTRAMUSCULAR; INTRAVENOUS; SUBCUTANEOUS at 13:18

## 2023-09-19 RX ADMIN — CARVEDILOL PHOSPHATE 6.25 MILLIGRAM(S): 80 CAPSULE, EXTENDED RELEASE ORAL at 17:25

## 2023-09-19 RX ADMIN — Medication 8 UNIT(S): at 07:10

## 2023-09-19 RX ADMIN — CARVEDILOL PHOSPHATE 6.25 MILLIGRAM(S): 80 CAPSULE, EXTENDED RELEASE ORAL at 06:52

## 2023-09-19 RX ADMIN — LOSARTAN POTASSIUM 50 MILLIGRAM(S): 100 TABLET, FILM COATED ORAL at 14:01

## 2023-09-19 RX ADMIN — Medication 25 MILLIGRAM(S): at 06:52

## 2023-09-19 NOTE — DISCHARGE NOTE PROVIDER - HOSPITAL COURSE
Patient discussed on morning rounds with Dr. Naylor    Primary Surgeon/Attending MD: Dr. Naylor    Referring Physician: No referring (transfer from [facility])  _ _ _ _ _ _ _ _ _ _ _ _  HOSPITAL COURSE:    _ _ _ _ _ _ _ _ _ _ _ _  DISCHARGE PHYSICAL EXAM:  Appearance: No acute distress.  Neurologic: AAOx3, no AMS or focal deficits.  Responds appropriately to verbal and physical stimuli; exhibits purposeful movement in all extremities.  HEENT:   MMM, PERRLA  Neck: Supple  Cardiovascular: RRR, S1 S2. No m/r/g.  Respiratory: No acute respiratory distress. CTA b/l, no w/r/r.   Gastrointestinal:  Soft, non-tender, non-distended, + BS.	  Skin: No rashes. No ecchymoses. No cyanosis.  Extremities: Exhibits normal range of motion, no clubbing, cyanosis or edema.  Vascular: Peripheral pulses palpable 2+ bilaterally.    _ _ _ _ _ _ _ _ _ _ _ _   MEDICATION DISCHARGE CHECKLIST    _ _ _ _ _ _ _ _ _ _ _ _  RELEVANT LABS/IMAGING:  < from: Xray Chest 1 View- PORTABLE-Routine (Xray Chest 1 View- PORTABLE-Routine in AM.) (09.18.23 @ 05:21) >    Findings/  impression: Stable cardiomegaly, thoracic aortic calcification.. Right   pleural effusion, decreased. Stable bony structures.    < end of copied text >    < from: TTE Echo Complete w/ Contrast w/ Doppler (09.16.23 @ 08:21) >    CONCLUSIONS:     1. Moderately dilated left ventricular size.   2. Severely reduced left ventricular systolic function, LVEF 10-15%.   3. Grade III left ventricular diastolic dysfunction.   4. Mildly dilated right ventricular size.   5. Moderately reduced right ventricular systolic function.   6. Biatrial enlargement.   7. Mild-to-moderate mitral regurgitation.   8. Pulmonary hypertension present, pulmonary artery systolic pressure is   60 mmHg.   9. Trivial pericardial effusion.  10. No prior echo is available for comparison.    < end of copied text >    _ _ _ _ _ _ _ _ _ _ _ _  CLINICAL FOLLOW UP NEEDS:    _ _ _ _ _ _ _ _ _ _ _ _  Over 35 minutes was spent with the patient reviewing the discharge material including medications, follow up appointments, recovery, concerning symptoms, and how to contact their health care providers if they have questions   Patient discussed on morning rounds with Dr. Naylor    Primary Surgeon/Attending MD: Dr. Naylor    Referring Physician: No referring (transfer from [facility])  _ _ _ _ _ _ _ _ _ _ _ _  HOSPITAL COURSE:  52 yo M smoker (5 cigarettes per day x 15 years), with no reported PMH or PSH who presented to [facility] for 3 weeks of progressive shortness of breath, [place], a cough and bilateral LE edema. At OSH, patient underwent a cardiac catheterization which revealed obstructive LAD disease for which he was referred to Dr. Naylor and transferred to [facility] on 9/15/23 for CABG evaluation. 9/16 BP elevated, up-titrate BB and start hydralazine. Echo w/ LVEF 10-15%. A1c 8.8. Endocrine & HF were consulted. 9/17-9/18 - pt continues GDMT & underwent a cardiac viability MRI. On 9/19 - as per discussion with Dr. Naylor and Dr. Gutierrez; the plan is for the patient to be d/c on GDMT & follow up as an outpatient with HF/cardiology & Dr. Naylor.     _ _ _ _ _ _ _ _ _ _ _ _  DISCHARGE PHYSICAL EXAM:  Appearance: No acute distress.  Neurologic: AAOx3, no AMS or focal deficits.  Responds appropriately to verbal and physical stimuli; exhibits purposeful movement in all extremities.  HEENT:   MMM, PERRLA  Neck: Supple  Cardiovascular: RRR, S1 S2. No m/r/g.  Respiratory: No acute respiratory distress. CTA b/l, no w/r/r.   Gastrointestinal:  Soft, non-tender, non-distended, + BS.	  Skin: No rashes. No ecchymoses. No cyanosis.  Extremities: Exhibits normal range of motion, no clubbing, cyanosis or edema.  Vascular: Peripheral pulses palpable 2+ bilaterally.  _ _ _ _ _ _ _ _ _ _ _ _   MEDICATION DISCHARGE CHECKLIST  - Lasix  - Aldactone  - Coreg  - Aspirin  -Lipitor  - Losartan  - Aldactone  - Glimepiride   - Jardiance   _ _ _ _ _ _ _ _ _ _ _ _  RELEVANT LABS/IMAGING:  < from: Xray Chest 1 View- PORTABLE-Routine (Xray Chest 1 View- PORTABLE-Routine in AM.) (09.18.23 @ 05:21) >    Findings/  impression: Stable cardiomegaly, thoracic aortic calcification.. Right   pleural effusion, decreased. Stable bony structures.    < end of copied text >    < from: TTE Echo Complete w/ Contrast w/ Doppler (09.16.23 @ 08:21) >    CONCLUSIONS:     1. Moderately dilated left ventricular size.   2. Severely reduced left ventricular systolic function, LVEF 10-15%.   3. Grade III left ventricular diastolic dysfunction.   4. Mildly dilated right ventricular size.   5. Moderately reduced right ventricular systolic function.   6. Biatrial enlargement.   7. Mild-to-moderate mitral regurgitation.   8. Pulmonary hypertension present, pulmonary artery systolic pressure is   60 mmHg.   9. Trivial pericardial effusion.  10. No prior echo is available for comparison.    < end of copied text >    < from: MR Cardiac w/wo IV Cont (09.18.23 @ 22:31) >    Impression:  1.  The left ventricle (LV) is dilated.  Left ventricular global systolic   function is reduced.  The LV ejection fraction is 21 %.  2.  The right ventricle (RV) is normal in size. RV global systolic   function is reduced. The RV ejection fraction is 24%.  3.  No significant valvular abnormalities.  4.  No significant abnormalities of the visualized portions of the great   vessels.  5.  Small pericardial effusion.  6.  Bilateral pleural effusions.  7.  On delayed enhancement imaging,  there is patchy enhancement of  the   mid infero RV insertion point which is a non-ishcemic finding.  The LV   myocardium appears viable  The sequences used in this study were designed for imaging cardiac   structures and are suboptimal for imaging other structures and organs.    --- End of Report ---    < end of copied text >  _ _ _ _ _ _ _ _ _ _ _ _  CLINICAL FOLLOW UP NEEDS:  - Patient does not have insurance: financial services & case management have provided patient with information regarding financial assistance. Including how to apply for insurance and options for free cardiology clinics.   - Patient is planned to follow up with Dr. Gutierrez in 1 week & again in 2 months. When he is medically optimized, he will be referred to Dr. Naylor for surgical evaluation.  - Patient reports not being able to afford medications. He was educated on the importance of each medication & was prescribed the most cost effective options. He is working with financial services for financial support for his medications.      _ _ _ _ _ _ _ _ _ _ _ _  Over 35 minutes was spent with the patient reviewing the discharge material including medications, follow up appointments, recovery, concerning symptoms, and how to contact their health care providers if they have questions

## 2023-09-19 NOTE — DISCHARGE NOTE NURSING/CASE MANAGEMENT/SOCIAL WORK - NSDCPEFALRISK_GEN_ALL_CORE
For information on Fall & Injury Prevention, visit: [email or web address] OR  [email or web address] OR  https://www.cdc.gov/steadi/patient.html

## 2023-09-19 NOTE — DISCHARGE NOTE PROVIDER - CARE PROVIDERS DIRECT ADDRESSES
[email or web address]

## 2023-09-19 NOTE — DIETITIAN INITIAL EVALUATION ADULT - OTHER CALCULATIONS
Estimated needs based on CBW as 102% IBW 166lb/75.5kg. Needs adjusted for age, HF with DM, and clinical status.   Defer fluids to team.

## 2023-09-19 NOTE — PROGRESS NOTE ADULT - SUBJECTIVE AND OBJECTIVE BOX
SUBJECTIVE / INTERVAL HPI: Patient was seen and examined this morning. Pt planned for discharge home today. No new complaints. Has eliminated the snacks and soda.    CAPILLARY BLOOD GLUCOSE & INSULIN RECEIVED  165 mg/dL (09-18 @ 16:29). Ate soup and salad.  143 mg/dL (09-18 @ 22:42). Lantus 15  135 mg/dL (09-19 @ 07:01) - Lispro 8. Ate sausage and potatoes  206 mg/dL (09-19 @ 11:29)  133 mg/dL (09-19 @ 16:44)      REVIEW OF SYSTEMS  Constitutional:  Negative fever, chills or loss of appetite.  Eyes:  Negative blurry vision or double vision.  Cardiovascular:  Negative for chest pain or palpitations.  Respiratory:  Negative for cough, wheezing, or shortness of breath.    Gastrointestinal:  Negative for nausea, vomiting, diarrhea, constipation, or abdominal pain.  Genitourinary:  Negative frequency, urgency or dysuria.  Neurologic:  No headache, confusion, dizziness, lightheadedness.    PHYSICAL EXAM  Vital Signs Last 24 Hrs  T(C): 36.7 (19 Sep 2023 17:10), Max: 36.7 (19 Sep 2023 05:01)  T(F): 98.1 (19 Sep 2023 17:10), Max: 98.1 (19 Sep 2023 05:01)  HR: 90 (19 Sep 2023 17:01) (76 - 96)  BP: 153/106 (19 Sep 2023 17:01) (122/69 - 165/108)  BP(mean): 126 (19 Sep 2023 17:01) (90 - 132)  RR: 15 (19 Sep 2023 13:16) (15 - 18)  SpO2: 97% (19 Sep 2023 13:16) (95% - 97%)    Parameters below as of 19 Sep 2023 13:16  Patient On (Oxygen Delivery Method): room air    Constitutional: Awake, alert, in no acute distress.   HEENT: Normocephalic, atraumatic, EDWARD.  Respiratory: Lungs clear to ausculation bilaterally.   Cardiovascular: regular rhythm, normal S1 and S2, no audible murmurs.   GI: soft, non-tender, non-distended, bowel sounds present.  Extremities: No lower extremity edema.  Psychiatric: AAO x 3. Normal affect/mood.     LABS  CBC - WBC/HGB/HTC/PLT: 5.97/12.0/36.9/180 (09-18-23)  BMP - Na/K/Cl/Bicarb/BUN/Cr/Gluc/AG/eGFR: 132/4.0/97/26/19/0.69/226/9/111 (09-18-23)  Ca - 7.6 (09-18-23)  Phos - -- (09-18-23)  Mg - 2.1 (09-18-23)  LFT - Alb/Tprot/Tbili/Dbili/AlkPhos/ALT/AST: 3.3/--/0.8/--/100/24/29 (09-16-23)  PT/aPTT/INR: 14.1/34.2/1.24 (09-15-23)   Thyroid Stimulating Hormone, Serum: 0.723 (09-15-23)      MEDICATIONS  MEDICATIONS  (STANDING):  aspirin enteric coated 81 milliGRAM(s) Oral daily  atorvastatin 80 milliGRAM(s) Oral at bedtime  carvedilol 6.25 milliGRAM(s) Oral every 12 hours  dextrose 5%. 1000 milliLiter(s) (50 mL/Hr) IV Continuous <Continuous>  dextrose 5%. 1000 milliLiter(s) (100 mL/Hr) IV Continuous <Continuous>  dextrose 50% Injectable 25 Gram(s) IV Push once  dextrose 50% Injectable 12.5 Gram(s) IV Push once  dextrose 50% Injectable 25 Gram(s) IV Push once  furosemide    Tablet 20 milliGRAM(s) Oral daily  glucagon  Injectable 1 milliGRAM(s) IntraMuscular once  glucagon  Injectable 1 milliGRAM(s) IntraMuscular once  heparin   Injectable 5000 Unit(s) SubCutaneous every 8 hours  influenza   Vaccine 0.5 milliLiter(s) IntraMuscular once  insulin glargine Injectable (LANTUS) 15 Unit(s) SubCutaneous at bedtime  insulin lispro (ADMELOG) corrective regimen sliding scale   SubCutaneous Before meals and at bedtime  insulin lispro Injectable (ADMELOG) 8 Unit(s) SubCutaneous three times a day before meals  losartan 50 milliGRAM(s) Oral daily  pantoprazole    Tablet 40 milliGRAM(s) Oral before breakfast  sodium chloride 0.9% lock flush 3 milliLiter(s) IV Push every 8 hours  spironolactone 25 milliGRAM(s) Oral daily    MEDICATIONS  (PRN):  acetaminophen     Tablet .. 1000 milliGRAM(s) Oral every 6 hours PRN Temp greater or equal to 38C (100.4F), Mild Pain (1 - 3)  albuterol/ipratropium for Nebulization 3 milliLiter(s) Nebulizer every 6 hours PRN Shortness of Breath and/or Wheezing  dextrose Oral Gel 15 Gram(s) Oral once PRN Blood Glucose LESS THAN 70 milliGRAM(s)/deciliter  zolpidem 5 milliGRAM(s) Oral at bedtime PRN Insomnia

## 2023-09-19 NOTE — DISCHARGE NOTE PROVIDER - CARE PROVIDER_API CALL
Ghanshyam Naylor  Thoracic and Cardiac Surgery  [address]  [place] [address]  Phone: [phone]  Fax: [phone]  Established Patient  Follow Up Time: 1 month   Ghanshyam Naylor  Thoracic and Cardiac Surgery  [address]  [place] [address]  Phone: [phone]  Fax: [phone]  Established Patient  Follow Up Time: 2 months    Mega Gutierrez  Adv Heart Fail Trnsplnt Cardio  [address] [address]  Phone: [phone]  Fax: [phone]  Established Patient  Follow Up Time: 1 week

## 2023-09-19 NOTE — DIETITIAN INITIAL EVALUATION ADULT - EDUCATION DIETARY MODIFICATIONS
Educated on nutrition therapy for BG control including sources of carbohydrates, moderate portion sizing, and building balanced meals. Pt aware RD remains available for additional questions/concerns, provided pt with business card./(2) meets goals/outcomes/verbalization

## 2023-09-19 NOTE — PROGRESS NOTE ADULT - ATTENDING COMMENTS
54 YO M with a history of active tobacco use and newly diagnosed DM2 (A1c 8.8%) who presented to [place] with dyspnea and found to have volume overload with severe LV dysfunction. LHC performed revealing obstructive LAD disease prompting transfer to [facility] for CABG (MIDCAB) evaluation.    His severe LV dilatation and global hypokinesis suggests a chronic component and a likely superimposed non-ischemic component such as hypertensive heart disease. Since his presentation was for HF and not ACS and lack of high risk disease on LHC, it is reasonable to medically manage his HF with GDMT for ~2-3 months to achieve some remodeling prior to surgery which is the current plan.     Optimal GDMT, and possibly followup, may be limited however since patient does not have health insurance at this time.     REVIEW OF STUDIES  TTE: LV 6.5 cm, LVEF 15% with global hypokinesis, moderate RV dysfunction, LVOT VTI 12 cm, mild-moderate MR, dilated IVC, PASP 60 mmHg   LHC: ~80% eccentric mid LAD disease just past first diagnoal, mild RCA disease, mild-moderate LCx disease   EKG: SR, LVH with strain, no Q waves, left axis deviation     PLAN  # Acute systolic heart failure  -Etiology: ischemic but dilatation and global hypokinesis suggest possible NICM component as well, likely hypertensive heart disease. cMRI obtained and report pending   -GDMT: please price check Entresto/Farxiga with patient, pending coverage and ability to cover costs will plan to have patient take carvedilol 6.25 BID, entresto 49-51 BID, and Farxiga 10 or carvedilol 6.25 BID, losartan 50, and spironolactone 25 mg daily   -Diuretics: continue lasix 20 mg PO daily   -Device: premature    # Coronary artery disease  - on ASA/statin  - cMRI report pending     # DM2  - Diagnosed this admission with A1c 8.8%, Endocrine consulted     Will attempt to arrange followup if clinic able to cover no fee visits

## 2023-09-19 NOTE — PROGRESS NOTE ADULT - SUBJECTIVE AND OBJECTIVE BOX
Heart Failure progress note    Interval History/HPI: Pt seen and examined at bedside. Feeling much better than when he initially came. in. Currently lives in [place] but is uninsured.     OBJECTIVE  T(C): 36.1 (09-19-23 @ 08:44), Max: 36.7 (09-19-23 @ 05:01)  HR: 96 (09-19-23 @ 05:00) (76 - 96)  BP: 165/108 (09-19-23 @ 05:00) (145/99 - 165/108)  RR: 18 (09-19-23 @ 05:00) (18 - 18)  SpO2: 96% (09-19-23 @ 05:00) (94% - 96%)    09-18-23 @ 07:01  -  09-19-23 @ 07:00  --------------------------------------------------------  IN: 200 mL / OUT: 0 mL / NET: 200 mL        PHYSICAL EXAM:  GEN: Awake, comfortable. NAD.   HEENT: MMM. No JVD.   RESP: CTA b/l  CV: RRR, normal s1/s2. No m/r/g.  ABD: Soft, NTND. BS+  EXT: Warm. No edema  NEURO: AAOx3. No focal deficits.    LABS:                        12.0   5.97  )-----------( 180      ( 18 Sep 2023 06:56 )             36.9     09-18    132<L>  |  97  |  19  ----------------------------<  226<H>  4.0   |  26  |  0.69    Ca    7.6<L>      18 Sep 2023 06:56  Mg     2.1     09-18        Urinalysis Basic - ( 18 Sep 2023 06:56 )    Color: x / Appearance: x / SG: x / pH: x  Gluc: 226 mg/dL / Ketone: x  / Bili: x / Urobili: x   Blood: x / Protein: x / Nitrite: x   Leuk Esterase: x / RBC: x / WBC x   Sq Epi: x / Non Sq Epi: x / Bacteria: x        RADIOLOGY & ADDITIONAL TESTS:  Reviewed .    MEDICATIONS  (STANDING):  aspirin enteric coated 81 milliGRAM(s) Oral daily  atorvastatin 80 milliGRAM(s) Oral at bedtime  carvedilol 6.25 milliGRAM(s) Oral every 12 hours  dextrose 5%. 1000 milliLiter(s) (50 mL/Hr) IV Continuous <Continuous>  dextrose 5%. 1000 milliLiter(s) (100 mL/Hr) IV Continuous <Continuous>  dextrose 50% Injectable 25 Gram(s) IV Push once  dextrose 50% Injectable 25 Gram(s) IV Push once  dextrose 50% Injectable 12.5 Gram(s) IV Push once  furosemide    Tablet 20 milliGRAM(s) Oral daily  glucagon  Injectable 1 milliGRAM(s) IntraMuscular once  glucagon  Injectable 1 milliGRAM(s) IntraMuscular once  heparin   Injectable 5000 Unit(s) SubCutaneous every 8 hours  influenza   Vaccine 0.5 milliLiter(s) IntraMuscular once  insulin glargine Injectable (LANTUS) 15 Unit(s) SubCutaneous at bedtime  insulin lispro (ADMELOG) corrective regimen sliding scale   SubCutaneous Before meals and at bedtime  insulin lispro Injectable (ADMELOG) 8 Unit(s) SubCutaneous three times a day before meals  pantoprazole    Tablet 40 milliGRAM(s) Oral before breakfast  sodium chloride 0.9% lock flush 3 milliLiter(s) IV Push every 8 hours    MEDICATIONS  (PRN):  acetaminophen     Tablet .. 1000 milliGRAM(s) Oral every 6 hours PRN Temp greater or equal to 38C (100.4F), Mild Pain (1 - 3)  albuterol/ipratropium for Nebulization 3 milliLiter(s) Nebulizer every 6 hours PRN Shortness of Breath and/or Wheezing  dextrose Oral Gel 15 Gram(s) Oral once PRN Blood Glucose LESS THAN 70 milliGRAM(s)/deciliter  zolpidem 5 milliGRAM(s) Oral at bedtime PRN Insomnia

## 2023-09-19 NOTE — DISCHARGE NOTE PROVIDER - NSDCFUADDINST_GEN_ALL_CORE_FT
-Please weigh yourself daily. If you notice over a 3 pound weight gain in 3 days, this is a sign you are likely retaining too much fluid. It is imperative you call our right away with unexplained rapid weight gain.      -No driving or strenuous activity/exercise until cleared by your surgeon.      -Call your doctor if you have shortness of breath, chest pain not relieved by pain medication, dizziness, fever >101.5, or increased redness or drainage from incisions.

## 2023-09-19 NOTE — DISCHARGE NOTE PROVIDER - NSDCFUADDAPPT_GEN_ALL_CORE_FT
Dr. Gutierrez's office (heart failure) will reach out to you regarding a follow up appointment day/time.

## 2023-09-19 NOTE — DIETITIAN INITIAL EVALUATION ADULT - ADD RECOMMEND
1. Recommend Consistent Carbohydrate (evening snack) diet in setting of basal insulin order.   >>Encourage & monitor PO intake. [place] dietary preferences as able.   2. Recommend CDCES consult to reinforce nutrition education.   3. Monitor GI tolerance, weight trends, labs, & skin integrity.  4. Defer bowel and pain regimens to team.   5. RD to remain available for diet education/intervention prn.

## 2023-09-19 NOTE — DIETITIAN INITIAL EVALUATION ADULT - PERTINENT LABORATORY DATA
09-18    132<L>  |  97  |  19  ----------------------------<  226<H>  4.0   |  26  |  0.69    Ca    7.6<L>      18 Sep 2023 06:56  Mg     2.1     09-18    POCT Blood Glucose.: 206 mg/dL (09-19-23 @ 11:29)  A1C with Estimated Average Glucose Result: 8.8 % (09-16-23 @ 06:57)  A1C with Estimated Average Glucose Result: 8.4 % (09-15-23 @ 21:26)

## 2023-09-19 NOTE — PROGRESS NOTE ADULT - PROBLEM SELECTOR PLAN 1
Type 2 diabetes mellitus with hyperglycemia  - Please continue lantus 15 units at bedtime.   - Increase lispro to 8 units before each meal.  - Continue lispro moderate dose sliding scale before meals and at bedtime.  - Patient's fingerstick glucose goal is 100-180 mg/dL.    - CDCES and RD consulted.  - Glucose testing supplies sent to [facility].  - For discharge: Glimepiride 1mg daily before breakfast. Jardiance 10mg daily if coupon is available.   Pt uninsured, so cost effect options are limited. Metformin is contraindicated with EF 15%. Due to pretty minimal insulin requirements in hospital, glimepiride 1mg before breakfast, Jardiance 10mg daily + good diet should control glucose at home, but will need to monitor at home and contact MD if glucose not controlled.  - Patient can follow up at discharge with [facility] by calling [phone] to make an appointment.      Case discussed with Dr. Trevino. Primary team update
Type 2 diabetes mellitus with hyperglycemia  - Please continue lantus 15 units at bedtime.   - Increase lispro to 8 units before each meal.  - Continue lispro moderate dose sliding scale before meals and at bedtime.  - Patient's fingerstick glucose goal is 100-180 mg/dL.    - CDCES and RD consulted.  - Glucose testing supplies sent to [facility].  - For discharge, patient can TBD  - Patient can follow up at discharge with [facility] by calling [phone] to make an appointment.      Case discussed with Dr. Trevino. Primary team updated.

## 2023-09-19 NOTE — DIETITIAN INITIAL EVALUATION ADULT - PERTINENT MEDS FT
MEDICATIONS  (STANDING):  aspirin enteric coated 81 milliGRAM(s) Oral daily  atorvastatin 80 milliGRAM(s) Oral at bedtime  carvedilol 6.25 milliGRAM(s) Oral every 12 hours  dextrose 5%. 1000 milliLiter(s) (50 mL/Hr) IV Continuous <Continuous>  dextrose 5%. 1000 milliLiter(s) (100 mL/Hr) IV Continuous <Continuous>  dextrose 50% Injectable 25 Gram(s) IV Push once  dextrose 50% Injectable 12.5 Gram(s) IV Push once  dextrose 50% Injectable 25 Gram(s) IV Push once  furosemide    Tablet 20 milliGRAM(s) Oral daily  glucagon  Injectable 1 milliGRAM(s) IntraMuscular once  glucagon  Injectable 1 milliGRAM(s) IntraMuscular once  heparin   Injectable 5000 Unit(s) SubCutaneous every 8 hours  influenza   Vaccine 0.5 milliLiter(s) IntraMuscular once  insulin glargine Injectable (LANTUS) 15 Unit(s) SubCutaneous at bedtime  insulin lispro (ADMELOG) corrective regimen sliding scale   SubCutaneous Before meals and at bedtime  insulin lispro Injectable (ADMELOG) 8 Unit(s) SubCutaneous three times a day before meals  losartan 50 milliGRAM(s) Oral daily  pantoprazole    Tablet 40 milliGRAM(s) Oral before breakfast  sodium chloride 0.9% lock flush 3 milliLiter(s) IV Push every 8 hours  spironolactone 25 milliGRAM(s) Oral daily    MEDICATIONS  (PRN):  acetaminophen     Tablet .. 1000 milliGRAM(s) Oral every 6 hours PRN Temp greater or equal to 38C (100.4F), Mild Pain (1 - 3)  albuterol/ipratropium for Nebulization 3 milliLiter(s) Nebulizer every 6 hours PRN Shortness of Breath and/or Wheezing  dextrose Oral Gel 15 Gram(s) Oral once PRN Blood Glucose LESS THAN 70 milliGRAM(s)/deciliter  zolpidem 5 milliGRAM(s) Oral at bedtime PRN Insomnia

## 2023-09-19 NOTE — DISCHARGE NOTE NURSING/CASE MANAGEMENT/SOCIAL WORK - PATIENT PORTAL LINK FT
You can access the FollowMyHealth Patient Portal offered by [facility] by registering at the following website: [email or web address]. By joining [facility]’s FollowMyHealth portal, you will also be able to view your health information using other applications (apps) compatible with our system.

## 2023-09-19 NOTE — DISCHARGE NOTE NURSING/CASE MANAGEMENT/SOCIAL WORK - NSDCFUADDAPPT_GEN_ALL_CORE_FT
Dr. Gutierrez's office (heart failure) will reach out to you regarding a follow up appointment day/time.  [[phone]]

## 2023-09-19 NOTE — DIETITIAN INITIAL EVALUATION ADULT - OTHER INFO
53M smoker (5 cigarettes per day x 15 years), with no reported PMH who presented to [facility] for 3 weeks of progressive shortness of breath, TREADWELL, a cough and bilateral LE edema. At OSH, patient underwent a cardiac catheterization which revealed obstructive LAD disease, transferred to [facility] on 9/15/23 for CABG evaluation. 9/16 BP elevated, up-titrate BB and start hydralazine. Echo w/ LVEF 10-15%. A1c 8.8. Endocrine & HF were consulted. 9/17-9/18 - pt continues GDMT & is planned for cardiac viability MRI.    On assessment, pt sitting in chair at bedside. Labs and medication orders reviewed. Ordered for 8U premeal admelog, 15U lantus, aldactone, lasix. No updated labs 9/19 available, POC BG (9/17-9/19) 108-310, HgbA1c (9/16) 8.8%. On Consistent Carbohydrate (no snack) diet. Pt speak English however more comfortable with Qatari for education comprehension,  used (#[id number]). Pt reports good appetite and intake. Reports UBW ~175lb, admission wt 169lb/76.5kg indicates ~6lb/8% wt loss over unknown time frame - likely secondary to fluid shifts in setting of diuretic therapy. Pt denies significant changes in wt/intake PTA. No overt signs of wasting observed. Pt denies nausea/vomiting/diarrhea/constipation, last BM 9/19. Pt denies difficulty chewing/swallowing. Confirms NKFA. No Islam/ethnic/cultural food preferences noted. No pressure injuries or edema documented, Temo score 21. Provided written and verbal education on carbohydrates and nutrition therapy for BG control. See nutrition recommendations. RD to follow-up per protocol.

## 2023-09-19 NOTE — DISCHARGE NOTE PROVIDER - NSDCMRMEDTOKEN_GEN_ALL_CORE_FT
aspirin 81 mg oral delayed release tablet: 1 tab(s) orally once a day  atorvastatin 80 mg oral tablet: 1 tab(s) orally once a day (at bedtime)  carvedilol 6.25 mg oral tablet: 1 tab(s) orally every 12 hours  Entresto 49 mg-51 mg oral tablet: 1 tab(s) orally every 12 hours  Entresto 49 mg-51 mg oral tablet: 1 tab(s) orally every 12 hours  Farxiga 10 mg oral tablet: 1 tab(s) orally once a day  furosemide 20 mg oral tablet: 1 tab(s) orally once a day  pantoprazole 40 mg oral delayed release tablet: 1 tab(s) orally once a day (before a meal)  potassium chloride 10 mEq oral capsule, extended release: 1 cap(s) orally once a day To take only while taking Furosemide (lasix)   Aldactone 25 mg oral tablet: 1 tab(s) orally once a day  aspirin 81 mg oral delayed release tablet: 1 tab(s) orally once a day  atorvastatin 80 mg oral tablet: 1 tab(s) orally once a day (at bedtime)  carvedilol 6.25 mg oral tablet: 1 tab(s) orally every 12 hours  Entresto 49 mg-51 mg oral tablet: 1 tab(s) orally every 12 hours  furosemide 20 mg oral tablet: 1 tab(s) orally once a day  losartan 50 mg oral tablet: 1 tab(s) orally once a day  pantoprazole 40 mg oral delayed release tablet: 1 tab(s) orally once a day (before a meal)  potassium chloride 10 mEq oral capsule, extended release: 1 cap(s) orally once a day To take only while taking Furosemide (lasix)   Aldactone 25 mg oral tablet: 1 tab(s) orally once a day  aspirin 81 mg oral delayed release tablet: 1 tab(s) orally once a day  atorvastatin 80 mg oral tablet: 1 tab(s) orally once a day (at bedtime)  carvedilol 6.25 mg oral tablet: 1 tab(s) orally every 12 hours  Entresto 49 mg-51 mg oral tablet: 1 tab(s) orally every 12 hours  furosemide 20 mg oral tablet: 1 tab(s) orally once a day  Januvia 100 mg oral tablet: 1 tab(s) orally once a day  Jardiance 10 mg oral tablet: 1 tab(s) orally once a day  Lantus 100 units/mL subcutaneous solution: 10 unit(s) subcutaneous once a day (at bedtime)  losartan 50 mg oral tablet: 1 tab(s) orally once a day  pantoprazole 40 mg oral delayed release tablet: 1 tab(s) orally once a day (before a meal)  potassium chloride 10 mEq oral capsule, extended release: 1 cap(s) orally once a day To take only while taking Furosemide (lasix)   alcohol swabs: Apply topically to affected area 4 times a day  Aldactone 25 mg oral tablet: 1 tab(s) orally once a day  aspirin 81 mg oral delayed release tablet: 1 tab(s) orally once a day  atorvastatin 80 mg oral tablet: 1 tab(s) orally once a day (at bedtime)  carvedilol 6.25 mg oral tablet: 1 tab(s) orally every 12 hours  furosemide 20 mg oral tablet: 1 tab(s) orally once a day  glimepiride 1 mg oral tablet: 1 tab(s) orally once a day before breakfast  glucometer: Test blood sugars four times a day. Dispense #1 glucometer.  Jardiance 10 mg oral tablet: 1 tab(s) orally once a day  lancets: 1 application subcutaneously 4 times a day  losartan 50 mg oral tablet: 1 tab(s) orally once a day  pantoprazole 40 mg oral delayed release tablet: 1 tab(s) orally once a day (before a meal)  test strips: 1 application subcutaneously 4 times a day. ** Compatible with patient&#x27;s glucometer **

## 2023-09-19 NOTE — DISCHARGE NOTE PROVIDER - PROVIDER TOKENS
PROVIDER:[[id number]],FOLLOWUP:[1 month],ESTABLISHEDPATIENT:[T]] PROVIDER:[[id number]],FOLLOWUP:[2 months],ESTABLISHEDPATIENT:[T]],PROVIDER:[[id number]],FOLLOWUP:[1 week],ESTABLISHEDPATIENT:[T]]

## 2023-09-19 NOTE — PROGRESS NOTE ADULT - ASSESSMENT
54 y/o male, smoker (5 cigarettes per day x 15 years), with no reported PMH or PSH who presented to [facility] with 3 weeks of TREADWELL, found to have new systolic heart failure with obstructive LAD on cardiac catheterization, transferred to [facility] for MIDCAB evaluation. HF consulted for systolic heart failure.     Review of studies:  [facility] ([facility]): obstructive LAD, no residual disease   EKG: NSR with biatrial enlargement and LVH with strain pattern  TTE (9/16/23): Dilated LV (6.5cm) with EF 15-20%, dilated RV with reduced function, biatrial enlargement, mild/mod MR, PASP 60, RAP 15    #Acute systolic heart failure  Etiology: Suspect ischemic, however, TTE with global hypokinesis, LV dilation, and without specific WMA suggests a nonischemic component (likely hypertensive heart disease). Follow-up cMRI  GDMT: Given patient presented with heart failure exacerbation (not ACS) and TTE with global dysfunction (not specific wma a/w obstructive LAD lesion), would be preferable to optimize GDMT/LVEF for 2 - 3 months prior to surgery. Recommend discontinuing hydral/imdur. Discharge on Coreg 6.25mg BID, Entresto 49/51 mg BID and farxiga 10mg.   If unable to obtain entresto/fargixa d/t insurance issues, recommend: losartan 50mg qd, spironolactone 25mg qd, and coreg 6.25mg BID.   Diuretics: continue lasix 20mg PO QD  Device: premature  AT: N/A  - will need outpatient follow-up -- attempting to arrange here vs [facility] free clinic    #CAD  Patient with obstructive LAD (no residual disease) on LHC at [facility]. Chest pain free.   - ASA/Statin  - CTS eval for possible MIDCAB in 2-3 months  - f/u cMRI    #DM uncontrolled (A1c 8.8)  - Endocrine following, appreciate recommendations, would start SGLT2i on dc (if able)    Case d/w Dr. Gutierrez   Plan discussed with team      Amy Duran  Cardiology Fellow

## 2023-09-21 DIAGNOSIS — E11.65 TYPE 2 DIABETES MELLITUS WITH HYPERGLYCEMIA: ICD-10-CM

## 2023-09-21 DIAGNOSIS — I34.0 NONRHEUMATIC MITRAL (VALVE) INSUFFICIENCY: ICD-10-CM

## 2023-09-21 DIAGNOSIS — I25.10 ATHEROSCLEROTIC HEART DISEASE OF NATIVE CORONARY ARTERY WITHOUT ANGINA PECTORIS: ICD-10-CM

## 2023-09-21 DIAGNOSIS — I11.0 HYPERTENSIVE HEART DISEASE WITH HEART FAILURE: ICD-10-CM

## 2023-09-21 DIAGNOSIS — F17.210 NICOTINE DEPENDENCE, CIGARETTES, UNCOMPLICATED: ICD-10-CM

## 2023-09-21 DIAGNOSIS — E78.5 HYPERLIPIDEMIA, UNSPECIFIED: ICD-10-CM

## 2023-09-21 DIAGNOSIS — I27.20 PULMONARY HYPERTENSION, UNSPECIFIED: ICD-10-CM

## 2023-09-21 DIAGNOSIS — I50.21 ACUTE SYSTOLIC (CONGESTIVE) HEART FAILURE: ICD-10-CM

## 2023-09-21 PROBLEM — Z00.00 ENCOUNTER FOR PREVENTIVE HEALTH EXAMINATION: Status: ACTIVE | Noted: 2023-09-21

## 2023-09-21 PROBLEM — Z78.9 OTHER SPECIFIED HEALTH STATUS: Chronic | Status: ACTIVE | Noted: 2023-09-15

## 2023-09-28 ENCOUNTER — APPOINTMENT (OUTPATIENT)
Dept: CARE COORDINATION | Facility: HOME HEALTH | Age: 53
End: 2023-09-28
Payer: SUBSIDIZED

## 2023-09-28 ENCOUNTER — NON-APPOINTMENT (OUTPATIENT)
Age: 53
End: 2023-09-28

## 2023-09-28 VITALS
TEMPERATURE: 98.7 F | WEIGHT: 174 LBS | RESPIRATION RATE: 18 BRPM | HEART RATE: 72 BPM | DIASTOLIC BLOOD PRESSURE: 78 MMHG | SYSTOLIC BLOOD PRESSURE: 126 MMHG

## 2023-09-28 DIAGNOSIS — Z72.0 TOBACCO USE: ICD-10-CM

## 2023-09-28 DIAGNOSIS — I50.9 HEART FAILURE, UNSPECIFIED: ICD-10-CM

## 2023-09-28 DIAGNOSIS — E11.9 TYPE 2 DIABETES MELLITUS W/OUT COMPLICATIONS: ICD-10-CM

## 2023-09-28 DIAGNOSIS — I10 ESSENTIAL (PRIMARY) HYPERTENSION: ICD-10-CM

## 2023-09-28 DIAGNOSIS — Z78.9 OTHER SPECIFIED HEALTH STATUS: ICD-10-CM

## 2023-09-28 DIAGNOSIS — I25.10 ATHEROSCLEROTIC HEART DISEASE OF NATIVE CORONARY ARTERY W/OUT ANGINA PECTORIS: ICD-10-CM

## 2023-09-28 DIAGNOSIS — Z82.49 FAMILY HISTORY OF ISCHEMIC HEART DISEASE AND OTHER DISEASES OF THE CIRCULATORY SYSTEM: ICD-10-CM

## 2023-09-28 DIAGNOSIS — R10.13 EPIGASTRIC PAIN: ICD-10-CM

## 2023-09-28 PROCEDURE — 99349 HOME/RES VST EST MOD MDM 40: CPT | Mod: 95

## 2023-09-29 ENCOUNTER — NON-APPOINTMENT (OUTPATIENT)
Age: 53
End: 2023-09-29

## 2023-09-29 ENCOUNTER — APPOINTMENT (OUTPATIENT)
Dept: HEART AND VASCULAR | Facility: CLINIC | Age: 53
End: 2023-09-29

## 2023-09-29 PROBLEM — E11.9 DM2 (DIABETES MELLITUS, TYPE 2): Status: ACTIVE | Noted: 2023-09-29

## 2023-09-29 PROBLEM — I25.10 CAD (CORONARY ARTERY DISEASE): Status: ACTIVE | Noted: 2023-09-29

## 2023-09-29 PROBLEM — I50.9 CHF (CONGESTIVE HEART FAILURE): Status: ACTIVE | Noted: 2023-09-29

## 2023-09-29 PROBLEM — Z72.0 TOBACCO USE: Status: ACTIVE | Noted: 2023-09-29

## 2023-09-29 PROBLEM — Z82.49 FAMILY HISTORY OF CORONARY ARTERY DISEASE: Status: ACTIVE | Noted: 2023-09-29

## 2023-09-29 PROBLEM — R10.13 DYSPEPSIA: Status: ACTIVE | Noted: 2023-09-29

## 2023-09-29 PROBLEM — Z78.9 SOCIAL ALCOHOL USE: Status: ACTIVE | Noted: 2023-09-29

## 2023-09-29 PROBLEM — I10 HTN (HYPERTENSION): Status: ACTIVE | Noted: 2023-09-29

## 2023-09-29 RX ORDER — GLIMEPIRIDE 1 MG/1
1 TABLET ORAL
Refills: 0 | Status: ACTIVE | COMMUNITY
Start: 2023-09-29

## 2023-09-29 RX ORDER — BLOOD SUGAR DIAGNOSTIC
STRIP MISCELLANEOUS
Refills: 0 | Status: ACTIVE | COMMUNITY
Start: 2023-09-29

## 2023-09-29 RX ORDER — BLOOD-GLUCOSE METER
W/DEVICE KIT MISCELLANEOUS
Refills: 0 | Status: ACTIVE | COMMUNITY
Start: 2023-09-29

## 2023-09-29 RX ORDER — ISOPROPYL ALCOHOL 0.7 ML/ML
SWAB TOPICAL
Refills: 0 | Status: ACTIVE | COMMUNITY
Start: 2023-09-29

## 2023-09-29 RX ORDER — ASPIRIN 81 MG/1
81 TABLET, COATED ORAL
Refills: 0 | Status: ACTIVE | COMMUNITY
Start: 2023-09-29

## 2023-09-29 RX ORDER — ATORVASTATIN CALCIUM 80 MG/1
80 TABLET, FILM COATED ORAL
Refills: 0 | Status: ACTIVE | COMMUNITY
Start: 2023-09-29

## 2023-09-29 RX ORDER — CARVEDILOL 6.25 MG/1
6.25 TABLET, FILM COATED ORAL
Refills: 0 | Status: ACTIVE | COMMUNITY
Start: 2023-09-29

## 2023-09-29 RX ORDER — SPIRONOLACTONE 25 MG/1
25 TABLET, FILM COATED ORAL
Refills: 0 | Status: ACTIVE | COMMUNITY
Start: 2023-09-29

## 2023-09-29 RX ORDER — LOSARTAN POTASSIUM 50 MG/1
50 TABLET, FILM COATED ORAL
Refills: 0 | Status: ACTIVE | COMMUNITY
Start: 2023-09-29

## 2023-09-29 RX ORDER — EMPAGLIFLOZIN 10 MG/1
10 TABLET, FILM COATED ORAL
Refills: 0 | Status: ACTIVE | COMMUNITY
Start: 2023-09-29

## 2023-09-29 RX ORDER — FUROSEMIDE 20 MG/1
20 TABLET ORAL
Refills: 0 | Status: ACTIVE | COMMUNITY
Start: 2023-09-29

## 2023-09-29 RX ORDER — LANCETS 33 GAUGE
EACH MISCELLANEOUS
Refills: 0 | Status: ACTIVE | COMMUNITY
Start: 2023-09-29

## 2023-09-29 RX ORDER — PANTOPRAZOLE 40 MG/1
40 TABLET, DELAYED RELEASE ORAL
Refills: 0 | Status: ACTIVE | COMMUNITY
Start: 2023-09-29

## 2023-10-04 ENCOUNTER — APPOINTMENT (OUTPATIENT)
Dept: HEART AND VASCULAR | Facility: CLINIC | Age: 53
End: 2023-10-04

## 2023-10-08 PROCEDURE — 83036 HEMOGLOBIN GLYCOSYLATED A1C: CPT

## 2023-10-08 PROCEDURE — 93880 EXTRACRANIAL BILAT STUDY: CPT

## 2023-10-08 PROCEDURE — 85027 COMPLETE CBC AUTOMATED: CPT

## 2023-10-08 PROCEDURE — 94640 AIRWAY INHALATION TREATMENT: CPT

## 2023-10-08 PROCEDURE — C8929: CPT

## 2023-10-08 PROCEDURE — 80061 LIPID PANEL: CPT

## 2023-10-08 PROCEDURE — 75561 CARDIAC MRI FOR MORPH W/DYE: CPT

## 2023-10-08 PROCEDURE — 85610 PROTHROMBIN TIME: CPT

## 2023-10-08 PROCEDURE — 71045 X-RAY EXAM CHEST 1 VIEW: CPT

## 2023-10-08 PROCEDURE — 81003 URINALYSIS AUTO W/O SCOPE: CPT

## 2023-10-08 PROCEDURE — 36415 COLL VENOUS BLD VENIPUNCTURE: CPT

## 2023-10-08 PROCEDURE — 85025 COMPLETE CBC W/AUTO DIFF WBC: CPT

## 2023-10-08 PROCEDURE — 80048 BASIC METABOLIC PNL TOTAL CA: CPT

## 2023-10-08 PROCEDURE — 82962 GLUCOSE BLOOD TEST: CPT

## 2023-10-08 PROCEDURE — 94150 VITAL CAPACITY TEST: CPT

## 2023-10-08 PROCEDURE — 84443 ASSAY THYROID STIM HORMONE: CPT

## 2023-10-08 PROCEDURE — 86901 BLOOD TYPING SEROLOGIC RH(D): CPT

## 2023-10-08 PROCEDURE — 86900 BLOOD TYPING SEROLOGIC ABO: CPT

## 2023-10-08 PROCEDURE — 83880 ASSAY OF NATRIURETIC PEPTIDE: CPT

## 2023-10-08 PROCEDURE — 85730 THROMBOPLASTIN TIME PARTIAL: CPT

## 2023-10-08 PROCEDURE — 83735 ASSAY OF MAGNESIUM: CPT

## 2023-10-08 PROCEDURE — 86850 RBC ANTIBODY SCREEN: CPT

## 2023-10-08 PROCEDURE — 80053 COMPREHEN METABOLIC PANEL: CPT

## 2023-10-08 PROCEDURE — A9577: CPT

## 2023-10-24 ENCOUNTER — APPOINTMENT (OUTPATIENT)
Dept: HEART AND VASCULAR | Facility: CLINIC | Age: 53
End: 2023-10-24

## 2023-10-25 ENCOUNTER — APPOINTMENT (OUTPATIENT)
Dept: HEART AND VASCULAR | Facility: CLINIC | Age: 53
End: 2023-10-25